# Patient Record
Sex: MALE | Race: WHITE | NOT HISPANIC OR LATINO | Employment: OTHER | URBAN - METROPOLITAN AREA
[De-identification: names, ages, dates, MRNs, and addresses within clinical notes are randomized per-mention and may not be internally consistent; named-entity substitution may affect disease eponyms.]

---

## 2017-12-11 ENCOUNTER — HOSPITAL ENCOUNTER (EMERGENCY)
Facility: HOSPITAL | Age: 62
Discharge: HOME | End: 2017-12-11
Attending: EMERGENCY MEDICINE
Payer: COMMERCIAL

## 2017-12-11 VITALS
BODY MASS INDEX: 32.96 KG/M2 | DIASTOLIC BLOOD PRESSURE: 77 MMHG | HEART RATE: 105 BPM | HEIGHT: 67 IN | TEMPERATURE: 100 F | WEIGHT: 210 LBS | OXYGEN SATURATION: 96 % | SYSTOLIC BLOOD PRESSURE: 119 MMHG | RESPIRATION RATE: 20 BRPM

## 2017-12-11 DIAGNOSIS — J18.9 PNEUMONIA OF RIGHT LOWER LOBE DUE TO INFECTIOUS ORGANISM: Primary | ICD-10-CM

## 2017-12-11 LAB
ANION GAP SERPL CALC-SCNC: 11 MMOL/L
BASOPHILS # BLD AUTO: 0 K/UL
BASOPHILS NFR BLD: 0.1 %
BUN SERPL-MCNC: 24 MG/DL
CALCIUM SERPL-MCNC: 9 MG/DL
CHLORIDE SERPL-SCNC: 99 MMOL/L
CO2 SERPL-SCNC: 25 MMOL/L
CREAT SERPL-MCNC: 1.4 MG/DL
DIFFERENTIAL METHOD: ABNORMAL
EOSINOPHIL # BLD AUTO: 0 K/UL
EOSINOPHIL NFR BLD: 0.1 %
ERYTHROCYTE [DISTWIDTH] IN BLOOD BY AUTOMATED COUNT: 14 %
EST. GFR  (AFRICAN AMERICAN): >60 ML/MIN/1.73 M^2
EST. GFR  (NON AFRICAN AMERICAN): 54 ML/MIN/1.73 M^2
FLUAV AG SPEC QL IA: NEGATIVE
FLUBV AG SPEC QL IA: NEGATIVE
GLUCOSE SERPL-MCNC: 112 MG/DL
HCT VFR BLD AUTO: 49.5 %
HGB BLD-MCNC: 16.6 G/DL
LACTATE SERPL-SCNC: 1.1 MMOL/L
LYMPHOCYTES # BLD AUTO: 0.7 K/UL
LYMPHOCYTES NFR BLD: 10.7 %
MCH RBC QN AUTO: 28.8 PG
MCHC RBC AUTO-ENTMCNC: 33.6 G/DL
MCV RBC AUTO: 86 FL
MONOCYTES # BLD AUTO: 0.4 K/UL
MONOCYTES NFR BLD: 6.2 %
NEUTROPHILS # BLD AUTO: 5.1 K/UL
NEUTROPHILS NFR BLD: 82.9 %
PLATELET # BLD AUTO: 168 K/UL
PMV BLD AUTO: 8.4 FL
POTASSIUM SERPL-SCNC: 4.7 MMOL/L
RBC # BLD AUTO: 5.78 M/UL
SODIUM SERPL-SCNC: 135 MMOL/L
SPECIMEN SOURCE: NORMAL
WBC # BLD AUTO: 6.2 K/UL

## 2017-12-11 PROCEDURE — 36415 COLL VENOUS BLD VENIPUNCTURE: CPT

## 2017-12-11 PROCEDURE — 80048 BASIC METABOLIC PNL TOTAL CA: CPT

## 2017-12-11 PROCEDURE — 96361 HYDRATE IV INFUSION ADD-ON: CPT

## 2017-12-11 PROCEDURE — 25000003 PHARM REV CODE 250: Performed by: EMERGENCY MEDICINE

## 2017-12-11 PROCEDURE — 85025 COMPLETE CBC W/AUTO DIFF WBC: CPT

## 2017-12-11 PROCEDURE — 83605 ASSAY OF LACTIC ACID: CPT

## 2017-12-11 PROCEDURE — 63600175 PHARM REV CODE 636 W HCPCS: Performed by: EMERGENCY MEDICINE

## 2017-12-11 PROCEDURE — 99284 EMERGENCY DEPT VISIT MOD MDM: CPT | Mod: 25

## 2017-12-11 PROCEDURE — 96374 THER/PROPH/DIAG INJ IV PUSH: CPT

## 2017-12-11 PROCEDURE — 87400 INFLUENZA A/B EACH AG IA: CPT

## 2017-12-11 RX ORDER — ONDANSETRON 2 MG/ML
4 INJECTION INTRAMUSCULAR; INTRAVENOUS
Status: COMPLETED | OUTPATIENT
Start: 2017-12-11 | End: 2017-12-11

## 2017-12-11 RX ORDER — AMOXICILLIN 875 MG/1
875 TABLET, FILM COATED ORAL 2 TIMES DAILY
Qty: 13 TABLET | Refills: 0 | Status: SHIPPED | OUTPATIENT
Start: 2017-12-11 | End: 2017-12-18

## 2017-12-11 RX ORDER — ONDANSETRON 4 MG/1
4 TABLET, ORALLY DISINTEGRATING ORAL EVERY 8 HOURS PRN
Qty: 12 TABLET | Refills: 0 | Status: SHIPPED | OUTPATIENT
Start: 2017-12-11 | End: 2020-01-28

## 2017-12-11 RX ORDER — ACETAMINOPHEN 325 MG/1
650 TABLET ORAL
Status: COMPLETED | OUTPATIENT
Start: 2017-12-11 | End: 2017-12-11

## 2017-12-11 RX ORDER — AZITHROMYCIN 250 MG/1
500 TABLET, FILM COATED ORAL
Status: COMPLETED | OUTPATIENT
Start: 2017-12-11 | End: 2017-12-11

## 2017-12-11 RX ORDER — AMOXICILLIN 500 MG/1
1000 CAPSULE ORAL
Status: COMPLETED | OUTPATIENT
Start: 2017-12-11 | End: 2017-12-11

## 2017-12-11 RX ORDER — AZITHROMYCIN 250 MG/1
250 TABLET, FILM COATED ORAL DAILY
Qty: 4 TABLET | Refills: 0 | Status: SHIPPED | OUTPATIENT
Start: 2017-12-12 | End: 2017-12-16

## 2017-12-11 RX ADMIN — ONDANSETRON HYDROCHLORIDE 4 MG: 2 INJECTION, SOLUTION INTRAMUSCULAR; INTRAVENOUS at 09:12

## 2017-12-11 RX ADMIN — AMOXICILLIN 1000 MG: 500 CAPSULE ORAL at 11:12

## 2017-12-11 RX ADMIN — AZITHROMYCIN 500 MG: 250 TABLET, FILM COATED ORAL at 11:12

## 2017-12-11 RX ADMIN — SODIUM CHLORIDE 1000 ML: 0.9 INJECTION, SOLUTION INTRAVENOUS at 09:12

## 2017-12-11 RX ADMIN — ACETAMINOPHEN 650 MG: 325 TABLET ORAL at 09:12

## 2017-12-11 NOTE — ED PROVIDER NOTES
Encounter Date: 12/11/2017    SCRIBE #1 NOTE: ISchuyler am scribing for, and in the presence of, Dr. Teague .       History     Chief Complaint   Patient presents with    Cough     x 1 week     Fever     flu shot last week        12/11/2017 9:22 AM     Chief complaint: Cough and Fever      Von Griffin Jr. is a 62 y.o. male with a hx of CTS who presents to the ED with complaints of productive cough and 101-102 fever  x 1 week. Associated sx are congestion, CP, decrease in appetite, nausea and generalized weakness. Spouse report a fever of 102.6 this morning. He reports diarrhea x 2 days which resolved. Pt denies vomiting, recent abroad travel, sick contact. No alleviating or exacerbating sx noted. NKDA noted.      The history is provided by the patient and the spouse.     Review of patient's allergies indicates:  No Known Allergies  Past Medical History:   Diagnosis Date    CTS (carpal tunnel syndrome)      Past Surgical History:   Procedure Laterality Date    CARPAL TUNNEL RELEASE      COLONOSCOPY N/A 12/1/2015    Procedure: COLONOSCOPY;  Surgeon: Darin Gutierrez MD;  [facility];  Service: Endoscopy;  Laterality: N/A;    EYE SURGERY      Eye Lid    TONSILLECTOMY       History reviewed. No pertinent family history.  Social History   Substance Use Topics    Smoking status: Former Smoker     Packs/day: 25.00    Smokeless tobacco: Not on file      Comment: quit 1985    Alcohol use Yes      Comment: Socially     Review of Systems   Constitutional: Positive for appetite change (Decrease ) and fever.   HENT: Positive for congestion. Negative for sore throat.    Eyes: Negative for redness.   Respiratory: Positive for cough. Negative for shortness of breath.    Cardiovascular: Positive for chest pain.   Gastrointestinal: Positive for diarrhea (Resolved) and nausea. Negative for vomiting.   Genitourinary: Negative for dysuria.   Musculoskeletal: Negative for back pain.   Skin: Negative for  rash.   Neurological: Positive for weakness (generalized).   Hematological: Does not bruise/bleed easily.       Physical Exam     Initial Vitals [12/11/17 0840]   BP Pulse Resp Temp SpO2   (!) 143/80 102 20 99.5 °F (37.5 °C) 98 %      MAP       101         Physical Exam    Nursing note and vitals reviewed.  Constitutional: He appears well-developed and well-nourished. He is not diaphoretic. No distress.   HENT:   Head: Normocephalic and atraumatic.   Mouth/Throat: Oropharynx is clear and moist.   Eyes: Conjunctivae are normal.   Neck: Neck supple.   Cardiovascular: Regular rhythm, normal heart sounds and intact distal pulses. Tachycardia present.  Exam reveals no gallop and no friction rub.    No murmur heard.  Mild tachycardia.    Pulmonary/Chest: He has wheezes (scattered). He has rhonchi (at bases). He has no rales.   Abdominal: Soft. He exhibits no distension. There is no tenderness.   Musculoskeletal: Normal range of motion.   Neurological: He is alert and oriented to person, place, and time.   Skin: No rash noted. No erythema.   Psychiatric: He has a normal mood and affect. His behavior is normal. Judgment and thought content normal.         ED Course   Procedures  Labs Reviewed   CBC W/ AUTO DIFFERENTIAL - Abnormal; Notable for the following:        Result Value    MPV 8.4 (*)     Lymph # 0.7 (*)     Gran% 82.9 (*)     Lymph% 10.7 (*)     All other components within normal limits   BASIC METABOLIC PANEL - Abnormal; Notable for the following:     Sodium 135 (*)     Glucose 112 (*)     BUN, Bld 24 (*)     eGFR if non  54 (*)     All other components within normal limits   LACTIC ACID, PLASMA   INFLUENZA A AND B ANTIGEN                        Scribe Attestation:   Scribe #1: I performed the above scribed service and the documentation accurately describes the services I performed. I attest to the accuracy of the note.    I, Dr. Avni Teague, personally performed the services described in  this documentation. All medical record entries made by the scribe were at my direction and in my presence.  I have reviewed the chart and agree that the record reflects my personal performance and is accurate and complete. Avni Teague MD.  2:44 PM 12/11/2017    Von Griffin Jr. is a 62 y.o. male presenting with upper respiratory symptoms and fever concerning for possible acquired pneumonia.  I do not think the patient requires admission for IV antibiotics for respiratory monitoring.  I doubt sepsis.  Lactic acid is normal.  He may be mildly dehydrated.  CURB 65 score is 1 due to mildly elevated BUN only.  He is appropriate for outpatient therapy.  Amoxicillin and azithromycin begun in ED with prescriptions given for outpatient course pending close PCP follow-up.  I doubt other emergent process such as myocarditis, PE, CHF.  Tachycardia resolved prior to discharge with acetaminophen and IVF.  F/u with PCP.  Return precautions reviewed.            ED Course as of Dec 11 1444   Mon Dec 11, 2017   0956 CXR:  RLL infiltrate consistent with pneumonia. (my read)  [MR]      ED Course User Index  [MR] Avni Teague MD     Clinical Impression:     1. Pneumonia of right lower lobe due to infectious organism                               Avni Teague MD  12/11/17 3969

## 2017-12-12 ENCOUNTER — TELEPHONE (OUTPATIENT)
Dept: FAMILY MEDICINE | Facility: CLINIC | Age: 62
End: 2017-12-12

## 2017-12-12 NOTE — TELEPHONE ENCOUNTER
----- Message from Yvette Martinez RT sent at 12/12/2017  2:40 PM CST -----  Contact: Destinee (wife) [phone]   Destinee (wife) [phone], requesting an [facility] ER F/U appt for the pt in one week from today, please work pt in, thanks.

## 2017-12-13 ENCOUNTER — NURSE TRIAGE (OUTPATIENT)
Dept: ADMINISTRATIVE | Facility: CLINIC | Age: 62
End: 2017-12-13

## 2017-12-13 NOTE — TELEPHONE ENCOUNTER
Reason for Disposition   Information only question and nurse able to answer    Protocols used: ST NO PROTOCOL AVAILABLE - INFORMATION ONLY-A-OH    Wife calling to report temp of 96.5. No other symptoms.  No shivering.   Had been up walking around.  Now in bed covered up.  Advised to recheck after in bed for a while and call back if temp did not come up or with any other questions/concerns.

## 2020-01-28 ENCOUNTER — OFFICE VISIT (OUTPATIENT)
Dept: ORTHOPEDICS | Facility: CLINIC | Age: 65
End: 2020-01-28
Payer: COMMERCIAL

## 2020-01-28 VITALS
DIASTOLIC BLOOD PRESSURE: 93 MMHG | HEIGHT: 67 IN | HEART RATE: 80 BPM | WEIGHT: 210 LBS | SYSTOLIC BLOOD PRESSURE: 150 MMHG | BODY MASS INDEX: 32.96 KG/M2

## 2020-01-28 DIAGNOSIS — G56.02 CARPAL TUNNEL SYNDROME ON LEFT: ICD-10-CM

## 2020-01-28 DIAGNOSIS — G56.01 CARPAL TUNNEL SYNDROME ON RIGHT: Primary | ICD-10-CM

## 2020-01-28 DIAGNOSIS — M54.12 CERVICAL RADICULITIS: ICD-10-CM

## 2020-01-28 DIAGNOSIS — Z98.890 HISTORY OF CARPAL TUNNEL SURGERY OF RIGHT WRIST: ICD-10-CM

## 2020-01-28 DIAGNOSIS — M19.012 ARTHRITIS OF LEFT ACROMIOCLAVICULAR JOINT: ICD-10-CM

## 2020-01-28 PROCEDURE — 99204 OFFICE O/P NEW MOD 45 MIN: CPT | Mod: 25,S$GLB,, | Performed by: ORTHOPAEDIC SURGERY

## 2020-01-28 PROCEDURE — 99204 PR OFFICE/OUTPT VISIT, NEW, LEVL IV, 45-59 MIN: ICD-10-PCS | Mod: 25,S$GLB,, | Performed by: ORTHOPAEDIC SURGERY

## 2020-01-28 PROCEDURE — 3008F PR BODY MASS INDEX (BMI) DOCUMENTED: ICD-10-PCS | Mod: S$GLB,,, | Performed by: ORTHOPAEDIC SURGERY

## 2020-01-28 PROCEDURE — 3008F BODY MASS INDEX DOCD: CPT | Mod: S$GLB,,, | Performed by: ORTHOPAEDIC SURGERY

## 2020-01-28 NOTE — PROGRESS NOTES
[facility] ORTHOPEDICS    Subjective:     Chief Complaint:   Chief Complaint   Patient presents with    Left Hand - Pain     Left hand pain x a while. States that he has numbness in the last 2 fingers.     Right Hand - Pain     Right hand pain x a while. He had a CTR in his right hand and states that he has issues with the hand still being sore and numbness in the hand.     Left Shoulder - Pain     Left shoulder pain x a while. States that he has pain in the shoulder and states that he has burning and tingling pain in the arm.        Past Medical History:   Diagnosis Date    CTS (carpal tunnel syndrome)        Past Surgical History:   Procedure Laterality Date    CARPAL TUNNEL RELEASE      COLONOSCOPY N/A 12/1/2015    Procedure: COLONOSCOPY;  Surgeon: Darin Gutierrez MD;  Location: [facility];  Service: Endoscopy;  Laterality: N/A;    EYE SURGERY      Eye Lid    TONSILLECTOMY         No current outpatient medications on file.     No current facility-administered medications for this visit.        Review of patient's allergies indicates:  No Known Allergies    History reviewed. No pertinent family history.    Social History     Socioeconomic History    Marital status:      Spouse name: Not on file    Number of children: Not on file    Years of education: Not on file    Highest education level: Not on file   Occupational History    Not on file   Social Needs    Financial resource strain: Not on file    Food insecurity:     Worry: Not on file     Inability: Not on file    Transportation needs:     Medical: Not on file     Non-medical: Not on file   Tobacco Use    Smoking status: Former Smoker     Packs/day: 25.00    Tobacco comment: quit 1985   Substance and Sexual Activity    Alcohol use: Yes     Comment: Socially    Drug use: No    Sexual activity: Not on file   Lifestyle    Physical activity:     Days per week: Not on file     Minutes per session: Not on file    Stress: Not on file    Relationships    Social connections:     Talks on phone: Not on file     Gets together: Not on file     Attends Anabaptist service: Not on file     Active member of club or organization: Not on file     Attends meetings of clubs or organizations: Not on file     Relationship status: Not on file   Other Topics Concern    Not on file   Social History Narrative    Not on file       History of present illness:  Patient comes in today for his bilateral upper extremities and his left shoulder.  He has got numbness and tingling in both his arms and hands.  He has had a carpal tunnel release on the right and cubital tunnel release on the right.  The left is much worse.  He is having wasting in his fingers and thumbs.  He is an avid athlete weight .  He trains daily.  He also has left shoulder pain. He describes it as mild.      Review of Systems:    Constitution: Negative for chills, fever, and sweats.  Negative for unexplained weight loss.    HENT:  Negative for headaches and blurry vision.    Cardiovascular:Negative for chest pain or irregular heart beat. Negative for hypertension.    Respiratory:  Negative for cough and shortness of breath.    Gastrointestinal: Negative for abdominal pain, heartburn, melena, nausea, and vomitting.    Genitourinary:  Negative bladder incontinence and dysuria.    Musculoskeletal:  See HPI for details.     Neurological: Negative for numbness.    Psychiatric/Behavioral: Negative for depression.  The patient is not nervous/anxious.      Endocrine: Negative for polyuria    Hematologic/Lymphatic: Negative for bleeding problem.  Does not bruise/bleed easily.    Skin: Negative for poor would healing and rash    Objective:      Physical Examination:    Vital Signs:    Vitals:    01/28/20 1348   BP: (!) 150/93   Pulse: 80       Body mass index is 32.89 kg/m².    This a well-developed, well nourished patient in no acute distress.  They are alert and oriented and cooperative to examination.         Patient has decreased sensation in the bilateral hands in both the median nail nerve distributions.  He does have hypothenar and thenar wasting.  He is very well muscled.  He has wasting of the triceps muscle.  The wasting is on the left side.  Spurling sign is negative.  Full range of motion of the cervical spine.  He has full range of motion is bilateral shoulders.  His painful arc of motion of the left shoulder.  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  I have ordered nerve conduction study EMG.  In terms of his shoulder he has severe arthritis of the glenohumeral joint.  Because he is not particularly symptomatic we will not intervene.  He will follow up with nerve conduction studies      This note was created using Dragon voice recognition software that occasionally misinterpreted phrases or words.

## 2020-02-03 ENCOUNTER — TELEPHONE (OUTPATIENT)
Dept: PHYSICAL MEDICINE AND REHAB | Facility: CLINIC | Age: 65
End: 2020-02-03

## 2020-02-03 ENCOUNTER — TELEPHONE (OUTPATIENT)
Dept: ORTHOPEDICS | Facility: CLINIC | Age: 65
End: 2020-02-03

## 2020-02-03 NOTE — TELEPHONE ENCOUNTER
----- Message from Lisy Weir sent at 2/3/2020  2:45 PM CST -----  Contact: self / [phone]  Needs to speak with you on his appointment. Please advise

## 2020-02-03 NOTE — TELEPHONE ENCOUNTER
----- Message from Madeleine Nino sent at 2/3/2020  1:19 PM CST -----  Contact: patient  He was calling because we set him up for an EMG and he said he doesn't need that but a NCS. Can we change the orders to an NCS instead of an EMG and send it to Dr. Ortiz? Call him back at [phone].

## 2020-02-03 NOTE — TELEPHONE ENCOUNTER
Spoke with pt does not want EMG just NCS. We will send in a new order to Dr. Cavanaugh for his NCS

## 2020-02-03 NOTE — TELEPHONE ENCOUNTER
----- Message from Annette Mohan sent at 2/3/2020  1:30 PM CST -----  Contact: Patient [phone]  Please call patient about his NCV not being scheduled. Dr Lam

## 2020-02-03 NOTE — TELEPHONE ENCOUNTER
----- Message from Shadia Simms sent at 2/3/2020  3:33 PM CST -----  Contact: patient  Type:  Patient Returning Call    Who Called:  patient  Who Left Message for Patient:  Jayla  Does the patient know what this is regarding?:  yes  Best Call Back Number:  [phone]  Additional Information:  Requesting a call back,call placed to pod

## 2020-02-05 ENCOUNTER — OFFICE VISIT (OUTPATIENT)
Dept: PHYSICAL MEDICINE AND REHAB | Facility: CLINIC | Age: 65
End: 2020-02-05
Payer: COMMERCIAL

## 2020-02-05 DIAGNOSIS — G56.20 ULNAR NEUROPATHY AT ELBOW, UNSPECIFIED LATERALITY: ICD-10-CM

## 2020-02-05 DIAGNOSIS — M54.12 CERVICAL RADICULOPATHY AT C8: Primary | ICD-10-CM

## 2020-02-05 DIAGNOSIS — G56.03 CARPAL TUNNEL SYNDROME ON BOTH SIDES: ICD-10-CM

## 2020-02-05 DIAGNOSIS — M54.12 CERVICAL RADICULITIS: ICD-10-CM

## 2020-02-05 DIAGNOSIS — M62.50 DENERVATION ATROPHY OF MUSCLE: ICD-10-CM

## 2020-02-05 PROCEDURE — 95911 NRV CNDJ TEST 9-10 STUDIES: CPT | Mod: 26,S$GLB,, | Performed by: PHYSICAL MEDICINE & REHABILITATION

## 2020-02-05 PROCEDURE — 99499 UNLISTED E&M SERVICE: CPT | Mod: S$GLB,,, | Performed by: PHYSICAL MEDICINE & REHABILITATION

## 2020-02-05 PROCEDURE — 99499 NO LOS: ICD-10-PCS | Mod: S$GLB,,, | Performed by: PHYSICAL MEDICINE & REHABILITATION

## 2020-02-05 PROCEDURE — 95886 MUSC TEST DONE W/N TEST COMP: CPT | Mod: 26,S$GLB,, | Performed by: PHYSICAL MEDICINE & REHABILITATION

## 2020-02-05 PROCEDURE — 95886 PR EMG COMPLETE, W/ NERVE CONDUCTION STUDIES, 5+ MUSCLES: ICD-10-PCS | Mod: 26,S$GLB,, | Performed by: PHYSICAL MEDICINE & REHABILITATION

## 2020-02-05 PROCEDURE — 95911 PR NERVE CONDUCTION STUDY; 9-10 STUDIES: ICD-10-PCS | Mod: 26,S$GLB,, | Performed by: PHYSICAL MEDICINE & REHABILITATION

## 2020-02-05 NOTE — PROCEDURES
Procedures         [facility]  Physical Medicine and Rehabilitation   [address] [place] [address], [address]  [place] [address]             Full Name: Von Griffin Patient ID: [id number]      Visit Date: 2/5/2020 16:07      Sensory NCS      Nerve / Sites Rec. Site Onset Lat Peak Lat NP Amp Segments Distance Velocity     ms ms µV  cm m/s   L Median - Digit II (Antidromic)      Wrist Dig II 4.01 5.05 6.6 Wrist - Dig II 14 35   R Median - Digit II (Antidromic)      Wrist Dig II 3.18 4.06 7.3 Wrist - Dig II 14 44   L Ulnar - Digit V (Antidromic)      Wrist Dig V 2.66 3.39 4.7 Wrist - Dig V 14 53   R Ulnar - Digit V (Antidromic)      Wrist Dig V NR NR NR Wrist - Dig V 14 NR   L Radial - Anatomical snuff box (Forearm)      Forearm Wrist 2.14 3.02 7.8 Forearm - Wrist 10 47   R Radial - Anatomical snuff box (Forearm)      Forearm Wrist 2.08 2.50 4.6 Forearm - Wrist 10 48       Motor NCS      Nerve / Sites Muscle Latency Amplitude Amp % Duration Segments Distance Lat Diff Velocity     ms mV % ms  cm ms m/s   L Median - APB      Wrist APB 5.57 5.4 100 5.89 Wrist - APB 8        Elbow APB 9.84 4.9 91.7 6.04 Elbow - Wrist 19 4.27 44   R Median - APB      Wrist APB 4.69 3.5 100 5.99 Wrist - APB 8        Elbow APB 10.05 0.5 14.4 6.98 Elbow - Wrist 19 5.36 35   L Ulnar - ADM      Wrist ADM 3.75 2.0 100 3.65 Wrist - ADM 8        B.Elbow ADM 7.92 1.7 86 4.01 B.Elbow - Wrist 21 4.17 50      A.Elbow ADM 10.57 1.2 60.5 4.84 A.Elbow - B.Elbow 8 2.66 30   R Ulnar - ADM      Wrist ADM 3.13 4.9 100 6.88 Wrist - ADM 8        B.Elbow ADM 6.56 2.0 40.7 4.95 B.Elbow - Wrist 18 3.44 52      A.Elbow ADM 8.70 2.3 47.1 5.16 A.Elbow - B.Elbow 8 2.14 37       EMG Summary Table     Spontaneous MUAP Recruitment   Muscle IA Fib PSW Fasc Other Amp Dur. PPP Pattern   R. Deltoid N None None None . N N N N   L. Deltoid N None None None . N N N N   L. Biceps brachii N None None None . N N N N   L. Triceps brachii 1- 4+ 4+ None . 1- 1- N Discrete   L.  Pronator teres 1+ None None None . N 2+ 2+ Reduced   L. Abductor pollicis brevis 2+ 2+ 2+ None . N 2+ 2+ Reduced   L. First dorsal interosseous 2+ 3+ 3+ None . N 2+ 2+ Reduced   L. Cervical paraspinals 2+ 2+ 2+ None            Summary    The motor conduction test was performed on 4 nerve(s). The results were normal in 1 nerve(s): R Ulnar - ADM. Results outside the specified normal range were found in 3 nerve(s), as follows:   In the L Median - APB study  o the take off latency result was increased for Wrist stimulation  o the peak amplitude result was reduced for Wrist stimulation  o the take off velocity result was reduced for Elbow - Wrist segment   In the R Median - APB study  o the take off latency result was increased for Wrist stimulation  o the peak amplitude result was reduced for Wrist stimulation  o the take off velocity result was reduced for Elbow - Wrist segment   In the L Ulnar - ADM study  o the peak amplitude result was reduced for Wrist stimulation    The sensory conduction test had results outside of the specified normal range in all 6 of the tested nerves:   In the L Median - Digit II (Antidromic) study  o the peak latency result was increased for Wrist stimulation  o the peak amplitude result was reduced for Wrist stimulation   In the R Median - Digit II (Antidromic) study  o the peak latency result was increased for Wrist stimulation  o the peak amplitude result was reduced for Wrist stimulation   In the L Ulnar - Digit V (Antidromic) study  o the peak amplitude result was reduced for Wrist stimulation   In the R Ulnar - Digit V (Antidromic) study  o the response was considered absent for Wrist stimulation   In the L Radial - Anatomical snuff box (Forearm) study  o the peak latency result was increased for Forearm stimulation  o the peak amplitude result was reduced for Forearm stimulation   In the R Radial - Anatomical snuff box (Forearm) study  o the peak amplitude result was reduced  for Forearm stimulation    The needle EMG examination was performed in 8 muscles. It was normal in 3 muscle(s): R. Deltoid, L. Deltoid, L. Biceps brachii. The study was abnormal in 5 muscle(s), with the following distribution:   Abnormal spontaneous/insertional activity was found in L. Triceps brachii, L. Pronator teres, L. Abductor pollicis brevis, L. First dorsal interosseous, L. Cervical paraspinals.   The MUP waveform abnormality was found in L. Triceps brachii, L. Pronator teres, L. Abductor pollicis brevis, L. First dorsal interosseous.   Abnormal interference pattern was found in L. Triceps brachii, L. Pronator teres, L. Abductor pollicis brevis, L. First dorsal interosseous.          Impression:  Abnormal examination.  There is electrodiagnostic evidence of:  1. An acute on chronic left C8 radiculopathy with evidence of denervation and reinnervation in the left triceps (radial C6, C7, and C8) left abductor pollicis brevis (median C8,T1) and 1st dorsal interossei (ulnar C8, T1)  2. Likely chronic left C7 radiculopathy with evidence of reinnervation.  3. 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 Ulnar neuropathy at the elbow on the right  5. Moderate right median mononeuropathy at the wrist (carpal tunnel syndrome.)  6. Moderate left median mononeuropathy at the wrist (carpal tunnel syndrome.)    Clinical history:  The chief complaint of left triceps atrophy and numbness and tingling in the left ring and small finger is consistent with the above diagnoses.  He has the finding of an acute on chronic left C7 radiculopathy with evidence of severe denervation of the left triceps and denervation and reinnervation of the left abductor pollicis brevis and 1st dorsal interossei.  He does have evidence of ulnar neuropathy at the elbow on the left but given the diffuseness of the findings in the C8 innervated muscles by the radial, median, and ulnar nerves, his symptoms are strongly suggestive of a C8  radiculopathy.  He also has decreased velocity of the median nerve which is likely secondary to axonal loss of the C8 nerve root.  I would recommend a new MRI of the cervical spine to assess for any abnormal pathology at C7-T1.  ____________________________   Wayne Ortiz D.O.  Board-Certified by the American Board of Physical Medicine and Rehabilitation  Board-Certified by the American Board of Electrodiagnostic Medicine

## 2020-02-05 NOTE — LETTER
February 5, 2020      Baldo Lam MD  [address]  [address]  [place] [address]           [place] [facility]  [address] [place] [address]  [address]  [place] [address]  Phone: [phone]  Fax: [phone]          Patient: Von Griffin Jr.   MR Number: [id number]   YOB: 1955   Date of Visit: 2/5/2020       Dear Dr. Baldo Lam:    Thank you for referring Von Griffin to me for evaluation. Attached you will find relevant portions of my assessment and plan of care.    If you have questions, please do not hesitate to call me. I look forward to following Von Griffin along with you.    Sincerely,    Wayne Ortiz MD    Enclosure  CC:  No Recipients    If you would like to receive this communication electronically, please contact [email or web address] or [phone] to request more information on [employer] access.    For providers and/or their staff who would like to refer a patient to [facility], please contact us through our one-stop-shop provider referral line, [facility] [place], at [phone].    If you feel you have received this communication in error or would no longer like to receive these types of communications, please e-mail [email or web address]

## 2020-02-11 ENCOUNTER — OFFICE VISIT (OUTPATIENT)
Dept: ORTHOPEDICS | Facility: CLINIC | Age: 65
End: 2020-02-11
Payer: COMMERCIAL

## 2020-02-11 VITALS — SYSTOLIC BLOOD PRESSURE: 160 MMHG | WEIGHT: 210 LBS | DIASTOLIC BLOOD PRESSURE: 100 MMHG | BODY MASS INDEX: 32.89 KG/M2

## 2020-02-11 DIAGNOSIS — M54.12 CERVICAL RADICULITIS: Primary | ICD-10-CM

## 2020-02-11 PROCEDURE — 99213 PR OFFICE/OUTPT VISIT, EST, LEVL III, 20-29 MIN: ICD-10-PCS | Mod: S$GLB,,, | Performed by: ORTHOPAEDIC SURGERY

## 2020-02-11 PROCEDURE — 3008F BODY MASS INDEX DOCD: CPT | Mod: S$GLB,,, | Performed by: ORTHOPAEDIC SURGERY

## 2020-02-11 PROCEDURE — 3008F PR BODY MASS INDEX (BMI) DOCUMENTED: ICD-10-PCS | Mod: S$GLB,,, | Performed by: ORTHOPAEDIC SURGERY

## 2020-02-11 PROCEDURE — 99213 OFFICE O/P EST LOW 20 MIN: CPT | Mod: S$GLB,,, | Performed by: ORTHOPAEDIC SURGERY

## 2020-02-11 NOTE — PROGRESS NOTES
[facility] ORTHOPEDICS    Subjective:     Chief Complaint:   Chief Complaint   Patient presents with    Left Hand - Pain     EMG results Bilat UE     Right Hand - Pain       Past Medical History:   Diagnosis Date    CTS (carpal tunnel syndrome)        Past Surgical History:   Procedure Laterality Date    CARPAL TUNNEL RELEASE      COLONOSCOPY N/A 12/1/2015    Procedure: COLONOSCOPY;  Surgeon: Darin Gutierrez MD;  Location: [facility];  Service: Endoscopy;  Laterality: N/A;    EYE SURGERY      Eye Lid    TONSILLECTOMY         No current outpatient medications on file.     No current facility-administered medications for this visit.        Review of patient's allergies indicates:  No Known Allergies    History reviewed. No pertinent family history.    Social History     Socioeconomic History    Marital status:      Spouse name: Not on file    Number of children: Not on file    Years of education: Not on file    Highest education level: Not on file   Occupational History    Not on file   Social Needs    Financial resource strain: Not on file    Food insecurity:     Worry: Not on file     Inability: Not on file    Transportation needs:     Medical: Not on file     Non-medical: Not on file   Tobacco Use    Smoking status: Former Smoker     Packs/day: 25.00    Tobacco comment: quit 1985   Substance and Sexual Activity    Alcohol use: Yes     Comment: Socially    Drug use: No    Sexual activity: Not on file   Lifestyle    Physical activity:     Days per week: Not on file     Minutes per session: Not on file    Stress: Not on file   Relationships    Social connections:     Talks on phone: Not on file     Gets together: Not on file     Attends [employer]ian service: Not on file     Active member of club or organization: Not on file     Attends meetings of clubs or organizations: Not on file     Relationship status: Not on file   Other Topics Concern    Not on file   Social History Narrative     Not on file       History of present illness:  Patient comes in today for his bilateral hands.  He has had his nerve conduction studies      Review of Systems:    Constitution: Negative for chills, fever, and sweats.  Negative for unexplained weight loss.    HENT:  Negative for headaches and blurry vision.    Cardiovascular:Negative for chest pain or irregular heart beat. Negative for hypertension.    Respiratory:  Negative for cough and shortness of breath.    Gastrointestinal: Negative for abdominal pain, heartburn, melena, nausea, and vomitting.    Genitourinary:  Negative bladder incontinence and dysuria.    Musculoskeletal:  See HPI for details.     Neurological: Negative for numbness.    Psychiatric/Behavioral: Negative for depression.  The patient is not nervous/anxious.      Endocrine: Negative for polyuria    Hematologic/Lymphatic: Negative for bleeding problem.  Does not bruise/bleed easily.    Skin: Negative for poor would healing and rash    Objective:      Physical Examination:    Vital Signs:    Vitals:    02/11/20 1255   BP: (!) 160/100       Body mass index is 32.89 kg/m².    This a well-developed, well nourished patient in no acute distress.  They are alert and oriented and cooperative to examination.        .  Patient has full range of motion of his left and right hands.  He has full range of motion of the cervical spine.  He has significant atrophy of the left triceps.  Pertinent New Results:  No conduction study/EMG is a reviewed.  The results reviewed.  XRAY Report / Interpretation:       Assessment/Plan:      Bilateral carpal tunnel syndrome.  Bilateral cubital tunnel syndrome.  C-8 radiculitis severe.  I have referred him to neuro surgery for possible decompression.  He will follow up with neuro surgery      This note was created using Dragon voice recognition software that occasionally misinterpreted phrases or words.

## 2020-02-17 ENCOUNTER — OFFICE VISIT (OUTPATIENT)
Dept: ORTHOPEDICS | Facility: CLINIC | Age: 65
End: 2020-02-17
Payer: COMMERCIAL

## 2020-02-17 VITALS — SYSTOLIC BLOOD PRESSURE: 140 MMHG | DIASTOLIC BLOOD PRESSURE: 92 MMHG | BODY MASS INDEX: 32.89 KG/M2 | WEIGHT: 210 LBS

## 2020-02-17 DIAGNOSIS — M48.02 CERVICAL STENOSIS OF SPINAL CANAL: Primary | ICD-10-CM

## 2020-02-17 DIAGNOSIS — M62.522 ATROPHY OF MUSCLE OF LEFT UPPER ARM: ICD-10-CM

## 2020-02-17 PROCEDURE — 99213 OFFICE O/P EST LOW 20 MIN: CPT | Mod: 25,S$GLB,, | Performed by: ORTHOPAEDIC SURGERY

## 2020-02-17 PROCEDURE — 3008F PR BODY MASS INDEX (BMI) DOCUMENTED: ICD-10-PCS | Mod: S$GLB,,, | Performed by: ORTHOPAEDIC SURGERY

## 2020-02-17 PROCEDURE — 99213 PR OFFICE/OUTPT VISIT, EST, LEVL III, 20-29 MIN: ICD-10-PCS | Mod: 25,S$GLB,, | Performed by: ORTHOPAEDIC SURGERY

## 2020-02-17 PROCEDURE — 3008F BODY MASS INDEX DOCD: CPT | Mod: S$GLB,,, | Performed by: ORTHOPAEDIC SURGERY

## 2020-02-17 NOTE — PROGRESS NOTES
Subjective:       Patient ID: Von Griffin Jr. is a 64 y.o. male.    Chief Complaint: Pain of the Neck (Denies neck pain but has bilateral arm to fingers with numbness/ tingling. Dr Lam referred. EMG done. MRI done of neck at [facility]  2017. He has many disks and reports)      History of Present Illness  This gentleman comes in today with chief complaints of atrophy of his left triceps muscle and intrinsic muscles of his left hand.  He had ulnar nerve transposition on the right elbow by Dr. Lam he denies any neck pain his main complaint is weakness in the triceps area.  He has seen several different physicians.  He had a recent EMG study    Current Medications  No current outpatient medications on file.     No current facility-administered medications for this visit.        Allergies  Review of patient's allergies indicates:  No Known Allergies    Past Medical History  Past Medical History:   Diagnosis Date    CTS (carpal tunnel syndrome)        Surgical History  Past Surgical History:   Procedure Laterality Date    CARPAL TUNNEL RELEASE      COLONOSCOPY N/A 12/1/2015    Procedure: COLONOSCOPY;  Surgeon: Darin Gutierrez MD;  Location: [facility];  Service: Endoscopy;  Laterality: N/A;    EYE SURGERY      Eye Lid    TONSILLECTOMY         Family History:   History reviewed. No pertinent family history.    Social History:   Social History     Socioeconomic History    Marital status:      Spouse name: Not on file    Number of children: Not on file    Years of education: Not on file    Highest education level: Not on file   Occupational History    Not on file   Social Needs    Financial resource strain: Not on file    Food insecurity:     Worry: Not on file     Inability: Not on file    Transportation needs:     Medical: Not on file     Non-medical: Not on file   Tobacco Use    Smoking status: Former Smoker     Packs/day: 25.00    Tobacco comment: quit 1985   Substance and Sexual  Activity    Alcohol use: Yes     Comment: Socially    Drug use: No    Sexual activity: Not on file   Lifestyle    Physical activity:     Days per week: Not on file     Minutes per session: Not on file    Stress: Not on file   Relationships    Social connections:     Talks on phone: Not on file     Gets together: Not on file     Attends [employer] service: Not on file     Active member of club or organization: Not on file     Attends meetings of clubs or organizations: Not on file     Relationship status: Not on file   Other Topics Concern    Not on file   Social History Narrative    Not on file       Hospitalization/Major Diagnostic Procedure:     Review of Systems     General/Constitutional:  Chills denies. Fatigue denies. Fever denies. Weight gain denies. Weight loss denies.    Respiratory:  Shortness of breath denies.    Cardiovascular:  Chest pain denies.    Gastrointestinal:  Constipation denies. Diarrhea denies. Nausea denies. Vomiting denies.     Hematology:  Easy bruising denies. Prolonged bleeding denies.     Genitourinary:  Frequent urination denies. Pain in lower back denies. Painful urination denies.     Musculoskeletal:  See HPI for details    Skin:  Rash denies.    Neurologic:  Dizziness denies. Gait abnormalities denies. Seizures denies. Tingling/Numbess denies.    Psychiatric:  Anxiety denies. Depressed mood denies.     Objective:   Vital Signs:   Vitals:    02/17/20 1234   BP: (!) 140/92        Physical Exam      General Examination:     Constitutional: The patient is alert and oriented to lace person and time. Mood is pleasant.     Head/Face: Normal facial features normal eyebrows    Eyes: Normal extraocular motion bilaterally    Lungs: Respirations are equal and unlabored    Gait is coordinated.    Cardiovascular: There are no swelling or varicosities present.    Lymphatic: Negative for adenopathy    Skin: Normal    Neurological: Level of consciousness normal. Oriented to place person and  time and situation    Psychiatric: Oriented to time place person and situation    Obvious triceps atrophy and weakness on the left no cervical painful range of motion Spurling's maneuver negative.  Tinel's test left elbow positive.  Mild atrophy of the hypothenar muscles.  No long tract signs.    XRAY Report/ Interpretation :  EMG nerve conduction study was reviewed showing a left C7 and T8 nerve root radiculopathy and left ulnar neuropathy.  MRI films from 2017 were reviewed showing multilevel cervical stenosis C5-C7.  AP and lateral x-rays of the cervical spine were performed today. Indications neck pain. Findings:  AP and lateral x-rays of the cervical spine were performed today. Indications neck pain. Findings:  Moderate disc space narrowing C5-6 and C6-7 levels with anterior osteophyte formation indicative longstanding cervical degenerative disc disease      Assessment:       1. Cervical stenosis of spinal canal    2. Atrophy of muscle of left upper arm        Plan:       Von was seen today for pain.    Diagnoses and all orders for this visit:    Cervical stenosis of spinal canal  -     X-Ray Cervical Spine AP And Lateral    Atrophy of muscle of left upper arm  Comments:  triceps         No follow-ups on file.    I believe his atrophy is triceps muscle is from cervical stenosis.  The most prudent type of surgery to offer him would be some type of posterior laminectomy or laminotomy and foraminotomy on the left side to try to regain his strength in the triceps nerve root distribution.  No guarantees that surgery will work but that would be the most logical surgery he does have significant degenerative disc disease seen on x-ray but has no significant neck pain and probably posterior approach would be better.  He has apparently discussed this condition with Dr. Lopez but not seen him as a formal patient. I have explained to him I do not do a great deal posterior cervical surgery and will refer him to Dr. Quick  for evaluation.    This note was created using Dragon voice recognition software that occasionally misinterpreted phrases or words.

## 2020-02-17 NOTE — PATIENT INSTRUCTIONS
Laminotomy  Laminotomy is a surgery that removes a small amount of bone from the spine. This takes pressure off nerves in the low back, which reduces symptoms. These surgeries are not cure-alls, but they are especially good at reducing leg pain.     Part of the lamina is removed from the vertebra above and below the pinched nerve.   Before your surgery  You will most likely arrive at the hospital on the morning of the surgery. Be sure to follow all of your doctors instructions on preparing for surgery:  · Follow any directions you are given for not eating or drinking before surgery.  · If you take a daily medicine, ask if you should still take it the morning of surgery.  · If you take any blood-thinning medicine, such as aspirin, clopidogrel, or warfarin, you should discuss them in great detail with your doctor at least a week in advance.   · At the hospital, your temperature, pulse, breathing, and blood pressure will be checked.  · An IV (intravenous) line will be started to provide fluids and medicines needed during surgery.  During your surgery  · Once in the operating room, youll be given anesthesia.  · After you are asleep, an incision is made near the center of your low back. Your incision may be 2 to 6 inches long, depending on how many vertebrae are involved.  · During a laminotomy, part of the lamina (bone that forms the back of the spinal canal) is removed from the vertebra above and below the pinched nerve. The small opening created is sometimes enough to take pressure off the nerve. But in most cases, disk matter or a bone spur that is pressing on the nerve is also removed.  · Once the nerve is free of pressure, the incision is closed with stitches or surgical staples.  After your surgery  After surgery, youll be sent to the PACU (postanesthesia care unit). When you are fully awake, youll be moved to your room. The nurses will give you medicines to ease your pain. You may have a catheter (small  tube) in your bladder. Soon, healthcare providers will help you get up and moving. Youll also be shown how to keep your lungs clear.  When to call your doctor  Once at home, call your doctor if you have any of the symptoms below:  · Unusual redness, heat, or drainage at the incision site  · Increasing pain, numbness, or weakness in your leg  · Fever over 100.4°F (38°C)  · Swelling, warmth, or pain in your calf   Date Last Reviewed: 9/8/2015  © 0462-8088 [employer]. [address] [place] [address]. All rights reserved. This information is not intended as a substitute for professional medical care. Always follow your healthcare professional's instructions.

## 2020-03-09 ENCOUNTER — TELEPHONE (OUTPATIENT)
Dept: PHYSICAL MEDICINE AND REHAB | Facility: CLINIC | Age: 65
End: 2020-03-09

## 2020-03-09 NOTE — TELEPHONE ENCOUNTER
Patient wants to know if the abdomen stem cells are still being done. He said someone told him that abdominal stem cells no longer can be done? He wants to know if that is true? He wants to do stem cells on his shoulder but he does not want to do bone marrow stem cells.

## 2020-03-09 NOTE — TELEPHONE ENCOUNTER
----- Message from Corey Lopez sent at 3/9/2020 11:46 AM CDT -----  Contact: Patient  Type: Needs Medical Advice    Who Called:  Patient  Best Call Back Number: [phone]  Additional Information: Patient would like to discuss stem cells. Please call to advise. Thanks!

## 2020-03-12 NOTE — TELEPHONE ENCOUNTER
Spoke with patient advised of what dr rivera said patient is not ready to do stem cell via bone marrow. He will call back should he change his mind

## 2021-11-23 ENCOUNTER — OFFICE VISIT (OUTPATIENT)
Dept: ORTHOPEDICS | Facility: CLINIC | Age: 66
End: 2021-11-23
Payer: MEDICARE

## 2021-11-23 VITALS — HEIGHT: 67 IN | BODY MASS INDEX: 32.96 KG/M2 | WEIGHT: 210 LBS

## 2021-11-23 DIAGNOSIS — G56.02 LEFT CARPAL TUNNEL SYNDROME: Primary | ICD-10-CM

## 2021-11-23 DIAGNOSIS — M19.042 PRIMARY OSTEOARTHRITIS OF LEFT HAND: ICD-10-CM

## 2021-11-23 DIAGNOSIS — G56.02 CARPAL TUNNEL SYNDROME OF LEFT WRIST: ICD-10-CM

## 2021-11-23 DIAGNOSIS — G56.22 ULNAR NERVE ENTRAPMENT AT ELBOW, LEFT: ICD-10-CM

## 2021-11-23 DIAGNOSIS — M54.12 CERVICAL RADICULOPATHY: ICD-10-CM

## 2021-11-23 PROCEDURE — 99213 OFFICE O/P EST LOW 20 MIN: CPT | Mod: S$GLB,,, | Performed by: ORTHOPAEDIC SURGERY

## 2021-11-23 PROCEDURE — 99213 PR OFFICE/OUTPT VISIT, EST, LEVL III, 20-29 MIN: ICD-10-PCS | Mod: S$GLB,,, | Performed by: ORTHOPAEDIC SURGERY

## 2021-11-26 ENCOUNTER — TELEPHONE (OUTPATIENT)
Dept: PAIN MEDICINE | Facility: CLINIC | Age: 66
End: 2021-11-26
Payer: MEDICARE

## 2021-11-29 ENCOUNTER — HOSPITAL ENCOUNTER (OUTPATIENT)
Dept: PREADMISSION TESTING | Facility: HOSPITAL | Age: 66
Discharge: HOME | End: 2021-11-29
Attending: ORTHOPAEDIC SURGERY
Payer: COMMERCIAL

## 2021-11-29 ENCOUNTER — HOSPITAL ENCOUNTER (OUTPATIENT)
Dept: RADIOLOGY | Facility: HOSPITAL | Age: 66
Discharge: HOME | End: 2021-11-29
Attending: ORTHOPAEDIC SURGERY
Payer: COMMERCIAL

## 2021-11-29 VITALS
BODY MASS INDEX: 34.53 KG/M2 | SYSTOLIC BLOOD PRESSURE: 155 MMHG | HEART RATE: 82 BPM | TEMPERATURE: 97 F | WEIGHT: 220 LBS | DIASTOLIC BLOOD PRESSURE: 94 MMHG | OXYGEN SATURATION: 97 % | RESPIRATION RATE: 17 BRPM | HEIGHT: 67 IN

## 2021-11-29 DIAGNOSIS — Z01.818 PRE-OP TESTING: Primary | ICD-10-CM

## 2021-11-29 DIAGNOSIS — G56.22 ULNAR NERVE ENTRAPMENT AT ELBOW, LEFT: ICD-10-CM

## 2021-11-29 DIAGNOSIS — G56.02 LEFT CARPAL TUNNEL SYNDROME: ICD-10-CM

## 2021-11-29 LAB — SARS-COV-2 RDRP RESP QL NAA+PROBE: NEGATIVE

## 2021-11-29 PROCEDURE — 93010 ELECTROCARDIOGRAM REPORT: CPT | Mod: ,,, | Performed by: SPECIALIST

## 2021-11-29 PROCEDURE — 93005 ELECTROCARDIOGRAM TRACING: CPT | Performed by: SPECIALIST

## 2021-11-29 PROCEDURE — U0002 COVID-19 LAB TEST NON-CDC: HCPCS | Performed by: ORTHOPAEDIC SURGERY

## 2021-11-29 PROCEDURE — 71046 X-RAY EXAM CHEST 2 VIEWS: CPT | Mod: TC

## 2021-11-29 PROCEDURE — 93010 EKG 12-LEAD: ICD-10-PCS | Mod: ,,, | Performed by: SPECIALIST

## 2021-11-30 ENCOUNTER — ANESTHESIA EVENT (OUTPATIENT)
Dept: SURGERY | Facility: HOSPITAL | Age: 66
End: 2021-11-30
Payer: MEDICARE

## 2021-11-30 ENCOUNTER — TELEPHONE (OUTPATIENT)
Dept: PHYSICAL MEDICINE AND REHAB | Facility: CLINIC | Age: 66
End: 2021-11-30
Payer: MEDICARE

## 2021-12-01 ENCOUNTER — ANESTHESIA (OUTPATIENT)
Dept: SURGERY | Facility: HOSPITAL | Age: 66
End: 2021-12-01
Payer: MEDICARE

## 2021-12-01 ENCOUNTER — TELEPHONE (OUTPATIENT)
Dept: ORTHOPEDICS | Facility: CLINIC | Age: 66
End: 2021-12-01
Payer: MEDICARE

## 2021-12-01 ENCOUNTER — HOSPITAL ENCOUNTER (OUTPATIENT)
Facility: HOSPITAL | Age: 66
Discharge: HOME | End: 2021-12-01
Attending: ORTHOPAEDIC SURGERY | Admitting: ORTHOPAEDIC SURGERY
Payer: MEDICARE

## 2021-12-01 VITALS
BODY MASS INDEX: 34.53 KG/M2 | TEMPERATURE: 98 F | OXYGEN SATURATION: 98 % | RESPIRATION RATE: 18 BRPM | HEART RATE: 77 BPM | DIASTOLIC BLOOD PRESSURE: 92 MMHG | SYSTOLIC BLOOD PRESSURE: 139 MMHG | HEIGHT: 67 IN | WEIGHT: 220 LBS

## 2021-12-01 DIAGNOSIS — G56.02 LEFT CARPAL TUNNEL SYNDROME: ICD-10-CM

## 2021-12-01 DIAGNOSIS — G56.22 ULNAR NERVE ENTRAPMENT AT ELBOW, LEFT: Primary | ICD-10-CM

## 2021-12-01 DIAGNOSIS — G56.02 CARPAL TUNNEL SYNDROME OF LEFT WRIST: ICD-10-CM

## 2021-12-01 DIAGNOSIS — R93.89 ABNORMAL CHEST X-RAY: Primary | ICD-10-CM

## 2021-12-01 PROCEDURE — 27000671 HC TUBING MICROBORE EXT: Performed by: ANESTHESIOLOGY

## 2021-12-01 PROCEDURE — 36000706: Performed by: ORTHOPAEDIC SURGERY

## 2021-12-01 PROCEDURE — 63600175 PHARM REV CODE 636 W HCPCS: Performed by: NURSE ANESTHETIST, CERTIFIED REGISTERED

## 2021-12-01 PROCEDURE — 63600175 PHARM REV CODE 636 W HCPCS: Performed by: ORTHOPAEDIC SURGERY

## 2021-12-01 PROCEDURE — 71000015 HC POSTOP RECOV 1ST HR: Performed by: ORTHOPAEDIC SURGERY

## 2021-12-01 PROCEDURE — 37000008 HC ANESTHESIA 1ST 15 MINUTES: Performed by: ORTHOPAEDIC SURGERY

## 2021-12-01 PROCEDURE — 27000673 HC TUBING BLOOD Y: Performed by: ANESTHESIOLOGY

## 2021-12-01 PROCEDURE — 25000003 PHARM REV CODE 250: Performed by: NURSE ANESTHETIST, CERTIFIED REGISTERED

## 2021-12-01 PROCEDURE — 27201423 OPTIME MED/SURG SUP & DEVICES STERILE SUPPLY: Performed by: ORTHOPAEDIC SURGERY

## 2021-12-01 PROCEDURE — 63600175 PHARM REV CODE 636 W HCPCS: Performed by: ANESTHESIOLOGY

## 2021-12-01 PROCEDURE — 27202107 HC XP QUATRO SENSOR: Performed by: ANESTHESIOLOGY

## 2021-12-01 PROCEDURE — 71000033 HC RECOVERY, INTIAL HOUR: Performed by: ORTHOPAEDIC SURGERY

## 2021-12-01 PROCEDURE — 36000707: Performed by: ORTHOPAEDIC SURGERY

## 2021-12-01 PROCEDURE — 25000003 PHARM REV CODE 250: Performed by: ANESTHESIOLOGY

## 2021-12-01 PROCEDURE — 63600175 PHARM REV CODE 636 W HCPCS

## 2021-12-01 PROCEDURE — 71000039 HC RECOVERY, EACH ADD'L HOUR: Performed by: ORTHOPAEDIC SURGERY

## 2021-12-01 PROCEDURE — 37000009 HC ANESTHESIA EA ADD 15 MINS: Performed by: ORTHOPAEDIC SURGERY

## 2021-12-01 PROCEDURE — 27200651 HC AIRWAY, LMA: Performed by: ANESTHESIOLOGY

## 2021-12-01 RX ORDER — DIPHENHYDRAMINE HYDROCHLORIDE 50 MG/ML
12.5 INJECTION INTRAMUSCULAR; INTRAVENOUS
Status: DISCONTINUED | OUTPATIENT
Start: 2021-12-01 | End: 2021-12-01 | Stop reason: HOSPADM

## 2021-12-01 RX ORDER — ONDANSETRON 2 MG/ML
INJECTION INTRAMUSCULAR; INTRAVENOUS
Status: DISCONTINUED | OUTPATIENT
Start: 2021-12-01 | End: 2021-12-01

## 2021-12-01 RX ORDER — ACETAMINOPHEN 10 MG/ML
INJECTION, SOLUTION INTRAVENOUS
Status: DISCONTINUED | OUTPATIENT
Start: 2021-12-01 | End: 2021-12-01

## 2021-12-01 RX ORDER — SODIUM CHLORIDE 0.9 % (FLUSH) 0.9 %
10 SYRINGE (ML) INJECTION
Status: DISCONTINUED | OUTPATIENT
Start: 2021-12-01 | End: 2021-12-01 | Stop reason: HOSPADM

## 2021-12-01 RX ORDER — OXYCODONE AND ACETAMINOPHEN 7.5; 325 MG/1; MG/1
1 TABLET ORAL EVERY 4 HOURS PRN
Qty: 28 TABLET | Refills: 0 | Status: SHIPPED | OUTPATIENT
Start: 2021-12-01 | End: 2023-11-30

## 2021-12-01 RX ORDER — DEXAMETHASONE SODIUM PHOSPHATE 4 MG/ML
INJECTION, SOLUTION INTRA-ARTICULAR; INTRALESIONAL; INTRAMUSCULAR; INTRAVENOUS; SOFT TISSUE
Status: DISCONTINUED | OUTPATIENT
Start: 2021-12-01 | End: 2021-12-01

## 2021-12-01 RX ORDER — ONDANSETRON 2 MG/ML
4 INJECTION INTRAMUSCULAR; INTRAVENOUS DAILY PRN
Status: DISCONTINUED | OUTPATIENT
Start: 2021-12-01 | End: 2021-12-01 | Stop reason: HOSPADM

## 2021-12-01 RX ORDER — HYDROMORPHONE HYDROCHLORIDE 1 MG/ML
0.2 INJECTION, SOLUTION INTRAMUSCULAR; INTRAVENOUS; SUBCUTANEOUS
Status: DISCONTINUED | OUTPATIENT
Start: 2021-12-01 | End: 2021-12-01 | Stop reason: HOSPADM

## 2021-12-01 RX ORDER — LIDOCAINE HYDROCHLORIDE 20 MG/ML
INJECTION, SOLUTION EPIDURAL; INFILTRATION; INTRACAUDAL; PERINEURAL
Status: DISCONTINUED | OUTPATIENT
Start: 2021-12-01 | End: 2021-12-01

## 2021-12-01 RX ORDER — MEPERIDINE HYDROCHLORIDE 50 MG/ML
12.5 INJECTION INTRAMUSCULAR; INTRAVENOUS; SUBCUTANEOUS EVERY 10 MIN PRN
Status: DISCONTINUED | OUTPATIENT
Start: 2021-12-01 | End: 2021-12-01 | Stop reason: HOSPADM

## 2021-12-01 RX ORDER — FENTANYL CITRATE 50 UG/ML
INJECTION, SOLUTION INTRAMUSCULAR; INTRAVENOUS
Status: DISCONTINUED | OUTPATIENT
Start: 2021-12-01 | End: 2021-12-01

## 2021-12-01 RX ORDER — HYDROMORPHONE HYDROCHLORIDE 1 MG/ML
INJECTION, SOLUTION INTRAMUSCULAR; INTRAVENOUS; SUBCUTANEOUS
Status: COMPLETED
Start: 2021-12-01 | End: 2021-12-01

## 2021-12-01 RX ORDER — MIDAZOLAM HYDROCHLORIDE 1 MG/ML
INJECTION INTRAMUSCULAR; INTRAVENOUS
Status: DISCONTINUED | OUTPATIENT
Start: 2021-12-01 | End: 2021-12-01

## 2021-12-01 RX ORDER — SODIUM CHLORIDE, SODIUM LACTATE, POTASSIUM CHLORIDE, CALCIUM CHLORIDE 600; 310; 30; 20 MG/100ML; MG/100ML; MG/100ML; MG/100ML
INJECTION, SOLUTION INTRAVENOUS CONTINUOUS PRN
Status: DISCONTINUED | OUTPATIENT
Start: 2021-12-01 | End: 2021-12-01

## 2021-12-01 RX ORDER — PROPOFOL 10 MG/ML
VIAL (ML) INTRAVENOUS
Status: DISCONTINUED | OUTPATIENT
Start: 2021-12-01 | End: 2021-12-01

## 2021-12-01 RX ORDER — LIDOCAINE HYDROCHLORIDE 20 MG/ML
JELLY TOPICAL
Status: DISCONTINUED | OUTPATIENT
Start: 2021-12-01 | End: 2021-12-01

## 2021-12-01 RX ORDER — OXYCODONE HYDROCHLORIDE 5 MG/1
5 TABLET ORAL
Status: DISCONTINUED | OUTPATIENT
Start: 2021-12-01 | End: 2021-12-01 | Stop reason: HOSPADM

## 2021-12-01 RX ORDER — CEFAZOLIN SODIUM 2 G/50ML
2 SOLUTION INTRAVENOUS
Status: COMPLETED | OUTPATIENT
Start: 2021-12-01 | End: 2021-12-01

## 2021-12-01 RX ADMIN — SODIUM CHLORIDE, SODIUM LACTATE, POTASSIUM CHLORIDE, AND CALCIUM CHLORIDE: .6; .31; .03; .02 INJECTION, SOLUTION INTRAVENOUS at 06:12

## 2021-12-01 RX ADMIN — PROPOFOL 140 MG: 10 INJECTION, EMULSION INTRAVENOUS at 07:12

## 2021-12-01 RX ADMIN — CEFAZOLIN SODIUM 2 G: 2 SOLUTION INTRAVENOUS at 07:12

## 2021-12-01 RX ADMIN — ACETAMINOPHEN 1000 MG: 10 INJECTION, SOLUTION INTRAVENOUS at 06:12

## 2021-12-01 RX ADMIN — HYDROMORPHONE HYDROCHLORIDE 0.2 MG: 1 INJECTION, SOLUTION INTRAMUSCULAR; INTRAVENOUS; SUBCUTANEOUS at 07:12

## 2021-12-01 RX ADMIN — HYDROMORPHONE HYDROCHLORIDE 0.2 MG: 1 INJECTION, SOLUTION INTRAMUSCULAR; INTRAVENOUS; SUBCUTANEOUS at 08:12

## 2021-12-01 RX ADMIN — LIDOCAINE HYDROCHLORIDE 5 ML: 20 JELLY TOPICAL at 07:12

## 2021-12-01 RX ADMIN — DEXAMETHASONE SODIUM PHOSPHATE 8 MG: 4 INJECTION, SOLUTION INTRAMUSCULAR; INTRAVENOUS at 07:12

## 2021-12-01 RX ADMIN — MIDAZOLAM HYDROCHLORIDE 1 MG: 1 INJECTION, SOLUTION INTRAMUSCULAR; INTRAVENOUS at 06:12

## 2021-12-01 RX ADMIN — ONDANSETRON 4 MG: 2 INJECTION INTRAMUSCULAR; INTRAVENOUS at 06:12

## 2021-12-01 RX ADMIN — FENTANYL CITRATE 50 MCG: 50 INJECTION INTRAMUSCULAR; INTRAVENOUS at 06:12

## 2021-12-01 RX ADMIN — OXYCODONE HYDROCHLORIDE 5 MG: 5 TABLET ORAL at 08:12

## 2021-12-01 RX ADMIN — FENTANYL CITRATE 50 MCG: 50 INJECTION INTRAMUSCULAR; INTRAVENOUS at 07:12

## 2021-12-01 RX ADMIN — LIDOCAINE HYDROCHLORIDE 80 MG: 20 INJECTION, SOLUTION INTRAVENOUS at 07:12

## 2021-12-03 ENCOUNTER — TELEPHONE (OUTPATIENT)
Dept: ORTHOPEDICS | Facility: CLINIC | Age: 66
End: 2021-12-03
Payer: MEDICARE

## 2021-12-06 ENCOUNTER — HOSPITAL ENCOUNTER (OUTPATIENT)
Dept: RADIOLOGY | Facility: HOSPITAL | Age: 66
Discharge: HOME | End: 2021-12-06
Attending: ORTHOPAEDIC SURGERY
Payer: MEDICARE

## 2021-12-06 DIAGNOSIS — R93.89 ABNORMAL CHEST X-RAY: ICD-10-CM

## 2021-12-06 PROCEDURE — 71250 CT THORAX DX C-: CPT | Mod: TC,PO

## 2021-12-15 ENCOUNTER — OFFICE VISIT (OUTPATIENT)
Dept: ORTHOPEDICS | Facility: CLINIC | Age: 66
End: 2021-12-15
Payer: COMMERCIAL

## 2021-12-15 VITALS — HEIGHT: 67 IN | WEIGHT: 212 LBS | BODY MASS INDEX: 33.27 KG/M2

## 2021-12-15 DIAGNOSIS — Z98.890 S/P CARPAL TUNNEL RELEASE: Primary | ICD-10-CM

## 2021-12-15 DIAGNOSIS — Z98.890 S/P CUBITAL TUNNEL RELEASE: ICD-10-CM

## 2021-12-15 DIAGNOSIS — M75.42 IMPINGEMENT SYNDROME OF LEFT SHOULDER: ICD-10-CM

## 2021-12-15 PROCEDURE — 99024 POSTOP FOLLOW-UP VISIT: CPT | Mod: S$GLB,,, | Performed by: PHYSICIAN ASSISTANT

## 2021-12-15 PROCEDURE — 99024 PR POST-OP FOLLOW-UP VISIT: ICD-10-PCS | Mod: S$GLB,,, | Performed by: PHYSICIAN ASSISTANT

## 2021-12-15 RX ORDER — OMEPRAZOLE 40 MG/1
CAPSULE, DELAYED RELEASE ORAL
COMMUNITY
Start: 2021-07-16

## 2022-02-07 ENCOUNTER — TELEPHONE (OUTPATIENT)
Dept: ORTHOPEDICS | Facility: CLINIC | Age: 67
End: 2022-02-07
Payer: MEDICARE

## 2022-02-07 DIAGNOSIS — M75.42 IMPINGEMENT SYNDROME OF BOTH SHOULDERS: Primary | ICD-10-CM

## 2022-02-07 DIAGNOSIS — M75.41 IMPINGEMENT SYNDROME OF BOTH SHOULDERS: Primary | ICD-10-CM

## 2022-02-07 NOTE — TELEPHONE ENCOUNTER
----- Message from Astrid Hendrickson sent at 2/7/2022  2:26 PM CST -----  Liliane w/[facility] called requesting an order for PT for Pt's bilat shoulders. Pt phone # [phone]. -BRANDI

## 2023-05-31 DIAGNOSIS — M50.223 HERNIATED NUCLEUS PULPOSUS, C6-7: ICD-10-CM

## 2023-05-31 DIAGNOSIS — M50.222 HERNIATED NUCLEUS PULPOSUS, C5-6: Primary | ICD-10-CM

## 2023-06-12 ENCOUNTER — HOSPITAL ENCOUNTER (OUTPATIENT)
Dept: RADIOLOGY | Facility: HOSPITAL | Age: 68
Discharge: HOME | End: 2023-06-12
Attending: NEUROLOGICAL SURGERY
Payer: MEDICARE

## 2023-06-12 DIAGNOSIS — M50.223 HERNIATED NUCLEUS PULPOSUS, C6-7: ICD-10-CM

## 2023-06-12 DIAGNOSIS — M50.222 HERNIATED NUCLEUS PULPOSUS, C5-6: ICD-10-CM

## 2023-06-12 PROCEDURE — 72050 X-RAY EXAM NECK SPINE 4/5VWS: CPT | Mod: TC,PO

## 2023-06-12 PROCEDURE — 72141 MRI NECK SPINE W/O DYE: CPT | Mod: TC,PO

## 2023-10-11 ENCOUNTER — PATIENT OUTREACH (OUTPATIENT)
Dept: ADMINISTRATIVE | Facility: HOSPITAL | Age: 68
End: 2023-10-11
Payer: MEDICARE

## 2023-10-11 NOTE — PROGRESS NOTES
Population Health Chart Review & Patient Outreach Details:     Reason for Outreach Encounter:     [x]  Non-Compliant Report   []  Payor Report ([employer], PHN, BCBS, MSSP, MCIP, C, etc.)   []  Pre-Visit Chart Review     Updates Requested / Reviewed:     [x]  Care Everywhere    []     []  External Sources ([facility], [facility], DIS, etc.)   [x]  Care Team Updated    Patient Outreach Method:    [x]  Telephone Outreach Completed   [] Successful   [x] Left Voicemail   [] Unable to Contact (wrong number, no voicemail)  []  [facility] Outreach Sent  [x]  Letter Outreach Mailed  []  Fax Sent for External Records  []  External Records Upload    Health Maintenance Topics Addressed and Outreach Outcomes / Actions Taken:        []      Breast Cancer Screening []  Mammo Scheduled      []  External Records Requested     []  Added Reminder to Complete to Upcoming Primary Care Appt Notes     []  Patient Declined     []  Patient Will Call Back to Schedule     []  Patient Will Schedule with External Provider / Order Routed if Applicable             []       Cervical Cancer Screening []  Pap Scheduled      []  External Records Requested     []  Added Reminder to Complete to Upcoming Primary Care Appt Notes     []  Patient Declined     []  Patient Will Call Back to Schedule     []  Patient Will Schedule with External Provider               []          Colorectal Cancer Screening []  Colonoscopy Case Request or Referral Placed     []  External Records Requested     []  Added Reminder to Complete to Upcoming Primary Care Appt Notes     []  Patient Declined     []  Patient Will Call Back to Schedule     []  Patient Will Schedule with External Provider     []  Fit Kit Mailed (add the SmartPhrase under additional notes)     []  Reminded Patient to Complete Home Test             []      Diabetic Eye Exam []  Eye Camera Scheduled or Optometry Referral Placed     []  External Records Requested     []  Added Reminder to Complete to  Upcoming Primary Care Appt Notes     []  Patient Declined     []  Patient Will Call Back to Schedule     []  Patient Will Schedule with External Provider             []      Blood Pressure Control []  Primary Care Follow Up Visit Scheduled     []  Remote Blood Pressure Reading Captured     []  Added Reminder to Complete to Upcoming Primary Care Appt Notes     []  Patient Declined     []  Patient Will Call Back / Patient Will Send Portal Message with Reading     []  Patient Will Call Back to Schedule Provider Visit             []       HbA1c & Other Labs []  Lab Appt Scheduled for Due Labs     []  Primary Care Follow Up Visit Scheduled      []  Reminded Patient to Complete Home Test     []  Added Reminder to Complete to Upcoming Primary Care Appt Notes     []  Patient Declined     []  Patient Will Call Back to Schedule     []  Patient Will Schedule with External Provider / Order Routed if Applicable           [x]    Schedule Primary Care Appt []  Primary Care Appt Scheduled     []  Patient Declined     []  Patient Will Call Back to Schedule     []  Pt Established with External Provider & Updated Care Team             []      Medication Adherence []  Primary Care Appointment Scheduled     []  Added Reminder to Upcoming Primary Care Appt Notes     []  Patient Reminded to  Prescription     []  Patient Declined, Provider Notified if Needed     []  Sent Provider Message to Review and/or Add Exclusion to Problem List             []      Osteoporosis Screening []  DXA Appointment Scheduled     []  External Records Requested     []  Added Reminder to Complete to Upcoming Primary Care Appt Notes     []  Patient Declined     []  Patient Will Call Back to Schedule     []  Patient Will Schedule with External Provider / Order Routed if Applicable     Additional Care Coordinator Notes:         Further Action Needed If Patient Returns Outreach:

## 2023-10-11 NOTE — LETTER
October 11, 2023    Von Griffin Jr.  [address] [facility] [address]             [facility]  [address]  [facility] [address]  Phone: [phone] Dear Blaise, [facility] is committed to your overall health.  We would like to assist you in choosing a Primary Care Physician.  Please call [phone] to schedule a routine checkup and establish care with one of our many Preferred Providers.  If you have a new Health Care Provider and would like to notify us, we will update your records for accuracy.      We appreciate the opportunity to provide you with excellent medical care.      Sincerely,    Iglesia MALDONADO  Clinical Care Coordinator

## 2023-10-16 ENCOUNTER — TELEPHONE (OUTPATIENT)
Dept: GASTROENTEROLOGY | Facility: CLINIC | Age: 68
End: 2023-10-16
Payer: MEDICARE

## 2023-10-16 NOTE — TELEPHONE ENCOUNTER
Returned call to patient to assist. Patient would like Dr. Guajardo. Advised him that provider does not work in that clinic.

## 2023-10-16 NOTE — TELEPHONE ENCOUNTER
----- Message from Iveth Tolliver sent at 10/16/2023  2:14 PM CDT -----  Regarding: Appointment Request  Contact: patient at [phone]  Type:  Appointment Request    Name of Caller:  patient at [phone]    Symptoms:  colonoscopy  Additional Information:  Please call and advise. Thank you

## 2023-10-17 ENCOUNTER — TELEPHONE (OUTPATIENT)
Dept: GASTROENTEROLOGY | Facility: CLINIC | Age: 68
End: 2023-10-17
Payer: MEDICARE

## 2023-10-17 NOTE — TELEPHONE ENCOUNTER
----- Message from Yisel Petersen sent at 10/16/2023  5:21 PM CDT -----  Type: General Call Back     Name of Caller:ISAURO CHANDLER JR. [[id number]]  Symptoms:colonoscopy  Would the patient rather a call back or a response via [facility]? Call back   Best Call Back Number:[phone]  Additional Information: Patient indicates he received a call from the providers office in regards to scheduling his colonoscopy with the provider. Patient indicates he decided he would like to   schedule the appointment for the soonest appointment. Patient would like a call back with further assistance and more information.

## 2023-10-18 ENCOUNTER — PATIENT MESSAGE (OUTPATIENT)
Dept: GASTROENTEROLOGY | Facility: CLINIC | Age: 68
End: 2023-10-18
Payer: MEDICARE

## 2023-10-18 DIAGNOSIS — Z12.11 ENCOUNTER FOR SCREENING COLONOSCOPY: Primary | ICD-10-CM

## 2023-11-30 ENCOUNTER — OFFICE VISIT (OUTPATIENT)
Dept: OTOLARYNGOLOGY | Facility: CLINIC | Age: 68
End: 2023-11-30
Payer: MEDICARE

## 2023-11-30 VITALS
DIASTOLIC BLOOD PRESSURE: 109 MMHG | HEIGHT: 67 IN | WEIGHT: 216.5 LBS | HEART RATE: 88 BPM | SYSTOLIC BLOOD PRESSURE: 175 MMHG | BODY MASS INDEX: 33.98 KG/M2

## 2023-11-30 DIAGNOSIS — J01.00 ACUTE NON-RECURRENT MAXILLARY SINUSITIS: Primary | ICD-10-CM

## 2023-11-30 PROCEDURE — 3077F PR MOST RECENT SYSTOLIC BLOOD PRESSURE >= 140 MM HG: ICD-10-PCS | Mod: CPTII,S$GLB,, | Performed by: PHYSICIAN ASSISTANT

## 2023-11-30 PROCEDURE — 1159F PR MEDICATION LIST DOCUMENTED IN MEDICAL RECORD: ICD-10-PCS | Mod: CPTII,S$GLB,, | Performed by: PHYSICIAN ASSISTANT

## 2023-11-30 PROCEDURE — 1101F PT FALLS ASSESS-DOCD LE1/YR: CPT | Mod: CPTII,S$GLB,, | Performed by: PHYSICIAN ASSISTANT

## 2023-11-30 PROCEDURE — 1160F RVW MEDS BY RX/DR IN RCRD: CPT | Mod: CPTII,S$GLB,, | Performed by: PHYSICIAN ASSISTANT

## 2023-11-30 PROCEDURE — 99203 PR OFFICE/OUTPT VISIT, NEW, LEVL III, 30-44 MIN: ICD-10-PCS | Mod: S$GLB,,, | Performed by: PHYSICIAN ASSISTANT

## 2023-11-30 PROCEDURE — 3008F BODY MASS INDEX DOCD: CPT | Mod: CPTII,S$GLB,, | Performed by: PHYSICIAN ASSISTANT

## 2023-11-30 PROCEDURE — 3288F PR FALLS RISK ASSESSMENT DOCUMENTED: ICD-10-PCS | Mod: CPTII,S$GLB,, | Performed by: PHYSICIAN ASSISTANT

## 2023-11-30 PROCEDURE — 3008F PR BODY MASS INDEX (BMI) DOCUMENTED: ICD-10-PCS | Mod: CPTII,S$GLB,, | Performed by: PHYSICIAN ASSISTANT

## 2023-11-30 PROCEDURE — 1160F PR REVIEW ALL MEDS BY PRESCRIBER/CLIN PHARMACIST DOCUMENTED: ICD-10-PCS | Mod: CPTII,S$GLB,, | Performed by: PHYSICIAN ASSISTANT

## 2023-11-30 PROCEDURE — 99203 OFFICE O/P NEW LOW 30 MIN: CPT | Mod: S$GLB,,, | Performed by: PHYSICIAN ASSISTANT

## 2023-11-30 PROCEDURE — 99999 PR PBB SHADOW E&M-EST. PATIENT-LVL IV: CPT | Mod: PBBFAC,,, | Performed by: PHYSICIAN ASSISTANT

## 2023-11-30 PROCEDURE — 3077F SYST BP >= 140 MM HG: CPT | Mod: CPTII,S$GLB,, | Performed by: PHYSICIAN ASSISTANT

## 2023-11-30 PROCEDURE — 99999 PR PBB SHADOW E&M-EST. PATIENT-LVL IV: ICD-10-PCS | Mod: PBBFAC,,, | Performed by: PHYSICIAN ASSISTANT

## 2023-11-30 PROCEDURE — 1125F AMNT PAIN NOTED PAIN PRSNT: CPT | Mod: CPTII,S$GLB,, | Performed by: PHYSICIAN ASSISTANT

## 2023-11-30 PROCEDURE — 1125F PR PAIN SEVERITY QUANTIFIED, PAIN PRESENT: ICD-10-PCS | Mod: CPTII,S$GLB,, | Performed by: PHYSICIAN ASSISTANT

## 2023-11-30 PROCEDURE — 3288F FALL RISK ASSESSMENT DOCD: CPT | Mod: CPTII,S$GLB,, | Performed by: PHYSICIAN ASSISTANT

## 2023-11-30 PROCEDURE — 1159F MED LIST DOCD IN RCRD: CPT | Mod: CPTII,S$GLB,, | Performed by: PHYSICIAN ASSISTANT

## 2023-11-30 PROCEDURE — 1101F PR PT FALLS ASSESS DOC 0-1 FALLS W/OUT INJ PAST YR: ICD-10-PCS | Mod: CPTII,S$GLB,, | Performed by: PHYSICIAN ASSISTANT

## 2023-11-30 PROCEDURE — 3080F PR MOST RECENT DIASTOLIC BLOOD PRESSURE >= 90 MM HG: ICD-10-PCS | Mod: CPTII,S$GLB,, | Performed by: PHYSICIAN ASSISTANT

## 2023-11-30 PROCEDURE — 3080F DIAST BP >= 90 MM HG: CPT | Mod: CPTII,S$GLB,, | Performed by: PHYSICIAN ASSISTANT

## 2023-11-30 RX ORDER — IBUPROFEN 800 MG/1
800 TABLET ORAL EVERY 6 HOURS PRN
COMMUNITY
Start: 2023-08-01

## 2023-11-30 RX ORDER — PENICILLIN V POTASSIUM 500 MG/1
500 TABLET, FILM COATED ORAL EVERY 6 HOURS
Status: ON HOLD | COMMUNITY
Start: 2023-11-09 | End: 2023-12-20

## 2023-11-30 RX ORDER — ANASTROZOLE 1 MG/1
1 TABLET ORAL
COMMUNITY
Start: 2023-09-28

## 2023-11-30 RX ORDER — TESTOSTERONE CYPIONATE 200 MG/ML
INJECTION, SOLUTION INTRAMUSCULAR
COMMUNITY
Start: 2023-09-27

## 2023-11-30 RX ORDER — CHLORHEXIDINE GLUCONATE ORAL RINSE 1.2 MG/ML
SOLUTION DENTAL
COMMUNITY
Start: 2023-11-27

## 2023-11-30 RX ORDER — LEVOCETIRIZINE DIHYDROCHLORIDE 5 MG/1
5 TABLET, FILM COATED ORAL NIGHTLY
Qty: 30 TABLET | Refills: 2 | Status: SHIPPED | OUTPATIENT
Start: 2023-11-30 | End: 2024-11-29

## 2023-11-30 NOTE — PATIENT INSTRUCTIONS
Complete penicillin prescription as prescribed.  Use flonase 1 spray to each nostril twice daily. Point up and slightly toward ear. Use xyzal 5mg in the evening.     Use NeilMed sinus rinse two times daily. Use distilled water. Do before using flonase. Follow up in 2 weeks to see how you are doing after completing above therapy.           DIRECTIONS FOR SINUS SALINE RINSE To see demonstration: Enter http://www.[employer].com/watch?v=[email or web address] into the browser address box, or go to You tube, and under the search box, enter sinus rinse. Click on NeilMed Sinus Rinse Video    Step 1    Step 1 Please wash your hands. Fill the clean bottle with the designated volume of warm distilled water, filtered water or previously boiled water. You may warm the water in a microwave but we recommend that you warm it in increments of five seconds. This is to avoid excessive heating of the water and damage to the device or scalding your nasal passage.    Step 2    Step 2 Cut the SINUS RINSE mixture packet at the corner and pour its contents into the bottle. Tighten the cap & tube on the bottle securely, place one finger over the tip of the cap and shake the bottle gently to dissolve the mixture.      Step 3    Step 3 Standing in front of a sink, bend forward to your comfort level and tilt your head down. Keeping your mouth open without holding your breath, place cap snugly against your nasal passage and SQUEEZE BOTTLE GENTLY until the solution starts draining from the OPPOSITE nasal passage or from your mouth. Keep squeezing the bottle GENTLY until at least 1/4 to 1/2 (60 to 120 mL) of the bottle is used for a proper rinse. Do not swallow the solution.    Step 4    Blow your nose gently, without pinching your nose completely because this will apply pressure on the eardrums. If tolerable, sniff in any residual solution remaining in the nasal passage once or twice prior to blowing your nose as this may clean out the posterior  nasopharyngeal area (the area at the back of your nasal passage). Some solution will reach the back of the throat, so please spit it out. To help improve drainage of any residual solution, blow your nose gently while tilting your head to the opposite side of the nasal passage that you just rinsed.    Step 5    Now repeat steps 3 & 4 for your other nasal passage.    Step 6     Air dry the Sinus Rinse bottle, cap, and tube on a clean paper towel, a glass plate to store the bottle cap and tube. If there is any solution leftover, please discard it. We recommend you make a fresh solution each time you rinse. Rinse 5 times each day, OR as directed by your physician. Warnings: DO NOT RINSE IF NASAL PASSAGE IS COMPLETELY BLOCKED OR IF YOU HAVE AN EAR INFECTION OR BLOCKED EARS. If you have had ear surgery, please contact your physician prior to irrigation. If you experience any pressure in your ears, stop the rinse and get further directions from your physician or contact our office during regular business hours. To avoid any ear discomfort: Heat the solution to lukewarm, do not use hot, boiling or cold water. Keep your mouth open. Do not hold your breath and if possible make the sound KERVIN....KERVIN... Make sure to take the position as shown. Gently squeeze 1/4 of the bottle at a time (60mL / 2 ounces). Stop the rinse if you feel any solution sensation near the ears. You may rinse with a partially blocked nasal passage. Please do not use for any other purposes. Please rinse at least ONE HOUR PRIOR to bedtime, in order to avoid any residual solution dripping in the throat.    >> Before using the SINUS RINSE kit, please inspect the cap, tube and bottle carefully for wear and tear. If any of the components appear discolored or cracked, please contact [facility] to obtain a replacement. You must follow the cleaning instructions provided in this brochure or cleaning instruction card prior to each use.    >> The SINUS RINSE bottle  and mixture are to be used only for nasalirrigation. Do not use for any other purposes.    >> We recommend that you use the rinse ONE HOUR PRIOR to bedtime in order to avoid any residual solution dripping in the throat.    Tips to avoid ear discomfort while rinsing    If you have had ear surgery, please contact your physician prior to irrigation. Do not use if you have an ear infection or blocked ears. Rinse with lukewarm water. Keep your mouth open. Do not hold your breath while rinsing. While rinsing, make sure to tilt your. Gently squeeze the bottle while rinsing; do not squeeze the bottle very forcefully. Stop the rinse if you feel a sensation of fluid near your ears.    Tips to avoid unexpected drainage after rinsing    In rare situations, especially if you have had sinus surgery, the saline solution can pool in the sinus cavities and nasal passages and then drip from your nostrils hours after rinsing. To avoid this harmless but annoying inconvenience, take one extra step after rinsing: lean forward, tilt your head sideways and gently blow your nose. Then, tilt your head to the other side and blow again. You may need to repeat this several times. This will help rid your nasal passages of any excess mucus and remaining saline solution. If you find yourself experiencing delayed drainage often, do not rinse right before leaving your house or going to bed.

## 2023-11-30 NOTE — PROGRESS NOTES
[facility]    Subjective:      Patient: Von Griffin Jr. Patient PCP: Adele, Primary Doctor         :  1955     Sex:  male      MRN:  [id number]          Date of Visit: 2023      Chief Complaint: Sinusitis    Patient ID: Von Griffin Jr. is a 68 y.o. male who presents to clinic for evaluation of sinusitis. Pt on penicillin V 500mg q6h x 10 days. Pt just started abx 2 days ago. Pt states that he has had pain above teeth (left upper teeth) and has left maxillary sinus pain started around 5-6 days. Pt had dental work, but this sinus pain is outside of his typeical dental work. Pt advised by his dentist that he had sinus infection. He was advised that it was not odontogenic in origin. Pt has had inflammation and irritation of left upper gingiva. Pt denies sinus infection in the last 12 months prior to this. Pt denies fever/chills, nasal congestion or purulent nasal discharge. Pt has high blood pressure today. He states that it is usually in the 130s/80s. Pt states that he is on daytime mucinex. He has flonase at home. Pt has issues with allergies throughout the year.    Past Medical History  He has a past medical history of CTS (carpal tunnel syndrome).    Family History  His family history is not on file.    Past Surgical History:   Procedure Laterality Date    CARPAL TUNNEL RELEASE      CARPAL TUNNEL RELEASE Left 2021    Procedure: RELEASE, CARPAL TUNNEL;  Surgeon: Baldo Lam MD;  Location: [facility];  Service: Orthopedics;  Laterality: Left;    COLONOSCOPY N/A 2015    Procedure: COLONOSCOPY;  Surgeon: Darin Gutierrez MD;  Location: [facility];  Service: Endoscopy;  Laterality: N/A;    EYE SURGERY      Eye Lid    TONSILLECTOMY      ULNAR TUNNEL RELEASE Left 2021    Procedure: RELEASE, CUBITAL TUNNEL;  Surgeon: Baldo Lam MD;  Location: [facility] OR;  Service: Orthopedics;  Laterality: Left;     Social History     Tobacco Use    Smoking status: Former     Current packs/day: 25.00      "Types: Cigarettes    Smokeless tobacco: Never    Tobacco comments:     quit 1985   Substance and Sexual Activity    Alcohol use: Yes     Comment: Socially    Drug use: No    Sexual activity: Not on file     Medications  He has a current medication list which includes the following prescription(s): anastrozole, chlorhexidine, ibuprofen, omeprazole, penicillin v potassium, and testosterone cypionate.    Review of patient's allergies indicates:  No Known Allergies  All medications, allergies, and past history have been reviewed.    Objective:      Vitals:      12/1/2021     5:45 AM 12/15/2021    11:28 AM 11/30/2023    10:43 AM   Vitals - 1 value per visit   SYSTOLIC 158     DIASTOLIC 94     Pulse 87     Temp 98.8 °F (37.1 °C)     Resp 16     SPO2 97 %     Weight (lb) 220 212 216.49   Weight (kg) 99.79 96.163 98.2   Height 5' 7" (1.702 m) 5' 7" (1.702 m) 5' 7" (1.702 m)   BMI (Calculated) 34.4 33.2 33.9   Pain Score  Zero Five       Body surface area is 2.15 meters squared.  Physical Exam  Constitutional:       General: He is not in acute distress.     Appearance: Normal appearance. He is not ill-appearing.   HENT:      Head: Normocephalic and atraumatic.      Right Ear: Tympanic membrane, ear canal and external ear normal.      Left Ear: Tympanic membrane, ear canal and external ear normal.      Nose: Septal deviation present.      Mouth/Throat:      Lips: Pink. No lesions.      Mouth: Mucous membranes are moist. No oral lesions.      Dentition: Abnormal dentition (inflammation of left upper gingiva).      Tongue: No lesions.      Palate: No lesions.      Pharynx: Oropharynx is clear. Uvula midline. No pharyngeal swelling, oropharyngeal exudate, posterior oropharyngeal erythema or uvula swelling.   Eyes:      General:         Right eye: No discharge.         Left eye: No discharge.      Extraocular Movements: Extraocular movements intact.      Conjunctiva/sclera: Conjunctivae normal.   Pulmonary:      Effort: Pulmonary " effort is normal.   Neurological:      General: No focal deficit present.      Mental Status: He is alert and oriented to person, place, and time. Mental status is at baseline.   Psychiatric:         Mood and Affect: Mood normal.         Behavior: Behavior normal.         Thought Content: Thought content normal.         Judgment: Judgment normal.       Labs:  WBC   Date Value Ref Range Status   11/29/2021 9.50 3.90 - 12.70 K/uL Final     Eosinophil %   Date Value Ref Range Status   11/29/2021 10.5 (H) 0.0 - 8.0 % Final     Eos #   Date Value Ref Range Status   11/29/2021 1.0 (H) 0.0 - 0.5 K/uL Final     Platelets   Date Value Ref Range Status   11/29/2021 268 150 - 450 K/uL Final     Glucose   Date Value Ref Range Status   11/29/2021 156 (H) 70 - 110 mg/dL Final     All lab results, imaging results, and data have been reviewed.    Assessment:        ICD-10-CM ICD-9-CM   1. Acute non-recurrent maxillary sinusitis  J01.00 461.0            Plan:      Acute non-recurrent maxillary sinusitis  -     Complete penicillin prescription as prescribed. Use flonase 1 spray to each nostril twice daily. Point up and slightly toward ear. Use xyzal 5mg in the evening. Use NeilMed sinus rinse two times daily. Use distilled water. Do before using flonase. Follow up in 2 weeks to see how you are doing after completing above therapy. Follow up in 2-3 weeks to ensure that sinus symptoms are resolving. If not, then we may proceed with CT maxillofacial to further ensure he is not suffering from odontogenic sinusitis.

## 2023-12-20 ENCOUNTER — ANESTHESIA (OUTPATIENT)
Dept: ENDOSCOPY | Facility: HOSPITAL | Age: 68
End: 2023-12-20
Payer: MEDICARE

## 2023-12-20 ENCOUNTER — ANESTHESIA EVENT (OUTPATIENT)
Dept: ENDOSCOPY | Facility: HOSPITAL | Age: 68
End: 2023-12-20
Payer: MEDICARE

## 2023-12-20 ENCOUNTER — HOSPITAL ENCOUNTER (OUTPATIENT)
Facility: HOSPITAL | Age: 68
Discharge: HOME | End: 2023-12-20
Attending: INTERNAL MEDICINE | Admitting: INTERNAL MEDICINE
Payer: MEDICARE

## 2023-12-20 VITALS
DIASTOLIC BLOOD PRESSURE: 98 MMHG | WEIGHT: 204 LBS | HEART RATE: 76 BPM | HEIGHT: 67 IN | RESPIRATION RATE: 16 BRPM | BODY MASS INDEX: 32.02 KG/M2 | OXYGEN SATURATION: 97 % | SYSTOLIC BLOOD PRESSURE: 162 MMHG | TEMPERATURE: 98 F

## 2023-12-20 DIAGNOSIS — K63.5 POLYP OF COLON, UNSPECIFIED PART OF COLON, UNSPECIFIED TYPE: Primary | ICD-10-CM

## 2023-12-20 DIAGNOSIS — K64.8 INTERNAL HEMORRHOIDS: ICD-10-CM

## 2023-12-20 DIAGNOSIS — Z12.11 SCREENING FOR COLON CANCER: ICD-10-CM

## 2023-12-20 DIAGNOSIS — K57.90 DIVERTICULOSIS: ICD-10-CM

## 2023-12-20 PROCEDURE — 45385 COLONOSCOPY W/LESION REMOVAL: CPT | Mod: 22 | Performed by: INTERNAL MEDICINE

## 2023-12-20 PROCEDURE — 45381 PR COLONOSCPY,FLEX,W/DIR SUBMUC INJECT: ICD-10-PCS | Mod: 22,PT,, | Performed by: INTERNAL MEDICINE

## 2023-12-20 PROCEDURE — 45380 COLONOSCOPY AND BIOPSY: CPT | Mod: 22,59 | Performed by: INTERNAL MEDICINE

## 2023-12-20 PROCEDURE — 37000009 HC ANESTHESIA EA ADD 15 MINS: Performed by: INTERNAL MEDICINE

## 2023-12-20 PROCEDURE — 27201012 HC FORCEPS, HOT/COLD, DISP: Performed by: INTERNAL MEDICINE

## 2023-12-20 PROCEDURE — 27201089 HC SNARE, DISP (ANY): Performed by: INTERNAL MEDICINE

## 2023-12-20 PROCEDURE — D9220A PRA ANESTHESIA: Mod: PT,CRNA,, | Performed by: NURSE ANESTHETIST, CERTIFIED REGISTERED

## 2023-12-20 PROCEDURE — D9220A PRA ANESTHESIA: ICD-10-PCS | Mod: PT,ANES,, | Performed by: ANESTHESIOLOGY

## 2023-12-20 PROCEDURE — 63600175 PHARM REV CODE 636 W HCPCS: Performed by: NURSE ANESTHETIST, CERTIFIED REGISTERED

## 2023-12-20 PROCEDURE — D9220A PRA ANESTHESIA: ICD-10-PCS | Mod: PT,CRNA,, | Performed by: NURSE ANESTHETIST, CERTIFIED REGISTERED

## 2023-12-20 PROCEDURE — 45380 PR COLONOSCOPY,BIOPSY: ICD-10-PCS | Mod: 22,59,PT, | Performed by: INTERNAL MEDICINE

## 2023-12-20 PROCEDURE — 88305 TISSUE EXAM BY PATHOLOGIST: CPT | Mod: TC,59 | Performed by: PATHOLOGY

## 2023-12-20 PROCEDURE — 45385 PR COLONOSCOPY,REMV LESN,SNARE: ICD-10-PCS | Mod: 22,PT,, | Performed by: INTERNAL MEDICINE

## 2023-12-20 PROCEDURE — 45380 COLONOSCOPY AND BIOPSY: CPT | Mod: 22,59,PT, | Performed by: INTERNAL MEDICINE

## 2023-12-20 PROCEDURE — 25000003 PHARM REV CODE 250: Performed by: INTERNAL MEDICINE

## 2023-12-20 PROCEDURE — 45381 COLONOSCOPY SUBMUCOUS NJX: CPT | Mod: 22,PT,, | Performed by: INTERNAL MEDICINE

## 2023-12-20 PROCEDURE — 25000003 PHARM REV CODE 250: Performed by: NURSE ANESTHETIST, CERTIFIED REGISTERED

## 2023-12-20 PROCEDURE — 27201028 HC NEEDLE, SCLERO: Performed by: INTERNAL MEDICINE

## 2023-12-20 PROCEDURE — 37000008 HC ANESTHESIA 1ST 15 MINUTES: Performed by: INTERNAL MEDICINE

## 2023-12-20 PROCEDURE — 45381 COLONOSCOPY SUBMUCOUS NJX: CPT | Mod: 22 | Performed by: INTERNAL MEDICINE

## 2023-12-20 PROCEDURE — 27200997: Performed by: INTERNAL MEDICINE

## 2023-12-20 PROCEDURE — D9220A PRA ANESTHESIA: Mod: PT,ANES,, | Performed by: ANESTHESIOLOGY

## 2023-12-20 PROCEDURE — 45385 COLONOSCOPY W/LESION REMOVAL: CPT | Mod: 22,PT,, | Performed by: INTERNAL MEDICINE

## 2023-12-20 RX ORDER — LIDOCAINE HYDROCHLORIDE 20 MG/ML
INJECTION INTRAVENOUS
Status: DISCONTINUED | OUTPATIENT
Start: 2023-12-20 | End: 2023-12-20

## 2023-12-20 RX ORDER — PROPOFOL 10 MG/ML
INJECTION, EMULSION INTRAVENOUS
Status: DISCONTINUED | OUTPATIENT
Start: 2023-12-20 | End: 2023-12-20

## 2023-12-20 RX ORDER — SODIUM CHLORIDE 9 MG/ML
INJECTION, SOLUTION INTRAVENOUS CONTINUOUS
Status: DISCONTINUED | OUTPATIENT
Start: 2023-12-20 | End: 2023-12-20 | Stop reason: HOSPADM

## 2023-12-20 RX ADMIN — PROPOFOL 25 MG: 10 INJECTION, EMULSION INTRAVENOUS at 12:12

## 2023-12-20 RX ADMIN — PROPOFOL 150 MG: 10 INJECTION, EMULSION INTRAVENOUS at 11:12

## 2023-12-20 RX ADMIN — SODIUM CHLORIDE: 0.9 INJECTION, SOLUTION INTRAVENOUS at 11:12

## 2023-12-20 RX ADMIN — PROPOFOL 50 MG: 10 INJECTION, EMULSION INTRAVENOUS at 11:12

## 2023-12-20 RX ADMIN — LIDOCAINE HYDROCHLORIDE 100 MG: 20 INJECTION, SOLUTION INTRAVENOUS at 11:12

## 2023-12-20 NOTE — ANESTHESIA POSTPROCEDURE EVALUATION
Anesthesia Post Evaluation    Patient: Von Griffin Jr.    Procedure(s) Performed: Procedure(s) (LRB):  COLONOSCOPY (N/A)    Final Anesthesia Type: general      Patient location during evaluation: PACU  Patient participation: Yes- Able to Participate  Level of consciousness: awake and alert  Post-procedure vital signs: reviewed and stable  Pain management: adequate  Airway patency: patent    PONV status at discharge: No PONV  Anesthetic complications: no      Cardiovascular status: blood pressure returned to baseline  Respiratory status: unassisted  Hydration status: euvolemic  Follow-up not needed.              Vitals Value Taken Time   /98 12/20/23 1255   Temp 36.6 °C (97.9 °F) 12/20/23 1255   Pulse 76 12/20/23 1255   Resp 16 12/20/23 1255   SpO2 97 % 12/20/23 1255         Event Time   Out of Recovery 13:03:17         Pain/Ronal Score: Ronal Score: 10 (12/20/2023 12:55 PM)

## 2023-12-20 NOTE — ANESTHESIA PREPROCEDURE EVALUATION
12/20/2023  Von Griffin Jr. is a 68 y.o., male.      Pre-op Assessment    I have reviewed the Patient Summary Reports.     I have reviewed the Nursing Notes. I have reviewed the NPO Status.   I have reviewed the Medications.     Review of Systems  Anesthesia Hx:  No problems with previous Anesthesia                Cardiovascular:  Cardiovascular Normal                                            Pulmonary:  Pulmonary Normal                       Neurological:  Neurology Normal   Denies Neuromuscular Disease.                                 Neuromuscular Disease       Physical Exam  General: Well nourished    Airway:  Mallampati: II   Mouth Opening: Normal  TM Distance: Normal  Neck ROM: Normal ROM        Anesthesia Plan  Type of Anesthesia, risks & benefits discussed:    Anesthesia Type: Gen Natural Airway  Intra-op Monitoring Plan: Standard ASA Monitors  Induction:  IV  Informed Consent: Informed consent signed with the Patient and all parties understand the risks and agree with anesthesia plan.  All questions answered.   ASA Score: 2    Ready For Surgery From Anesthesia Perspective.     .

## 2023-12-20 NOTE — [employer] PATIENT INSTRUCTIONS
Discharge Summary/Instructions after an Endoscopic Procedure  Patient Name: Von Griffin  Patient MRN: [id number]  Patient YOB: 1955 Wednesday, December 20, 2023  William Price MD  Dear patient,  As a result of recent federal legislation (The Federal Cures Act), you may   receive lab or pathology results from your procedure in your [facility]   account before your physician is able to contact you. Your physician or   their representative will relay the results to you with their   recommendations at their soonest availability.  Thank you,  RESTRICTIONS:  During your procedure today, you received medications for sedation.  These   medications may affect your judgment, balance and coordination.  Therefore,   for 24 hours, you have the following restrictions:   - DO NOT drive a car, operate machinery, make legal/financial decisions,   sign important papers or drink alcohol.    ACTIVITY:  Today: no heavy lifting, straining or running due to procedural   sedation/anesthesia.  The following day: return to full activity including work.  DIET:  Eat and drink normally unless instructed otherwise.     TREATMENT FOR COMMON SIDE EFFECTS:  - Mild abdominal pain, nausea, belching, bloating or excessive gas:  rest,   eat lightly and use a heating pad.  - Sore Throat: treat with throat lozenges and/or gargle with warm salt   water.  - Because air was used during the procedure, expelling large amounts of air   from your rectum or belching is normal.  - If a bowel prep was taken, you may not have a bowel movement for 1-3 days.    This is normal.  SYMPTOMS TO WATCH FOR AND REPORT TO YOUR PHYSICIAN:  1. Abdominal pain or bloating, other than gas cramps.  2. Chest pain.  3. Back pain.  4. Signs of infection such as: chills or fever occurring within 24 hours   after the procedure.  5. Rectal bleeding, which would show as bright red, maroon, or black stools.   (A tablespoon of blood from the rectum is not serious, especially  if   hemorrhoids are present.)  6. Vomiting.  7. Weakness or dizziness.  GO DIRECTLY TO THE NEAREST EMERGENCY ROOM IF YOU HAVE ANY OF THE FOLLOWING:      Difficulty breathing              Chills and/or fever over 101 F   Persistent vomiting and/or vomiting blood   Severe abdominal pain   Severe chest pain   Black, tarry stools   Bleeding- more than one tablespoon   Any other symptom or condition that you feel may need urgent attention  Your doctor recommends these additional instructions:  If any biopsies were taken, your doctors clinic will contact you in 1 to 2   weeks with any results.  - Patient has a contact number available for emergencies.  The signs and   symptoms of potential delayed complications were discussed with the   patient.  Return to normal activities tomorrow.  Written discharge   instructions were provided to the patient.   - High fiber diet.   - Continue present medications.   - No aspirin, ibuprofen, naproxen, or other non-steroidal anti-inflammatory   drugs for 2 weeks after polyp removal.   - Await pathology results.   - Repeat colonoscopy in 3 years for surveillance.   - Discharge patient to home (ambulatory).   - Return to GI office PRN.  For questions, problems or results please call your physician - William Price MD at Work:  [phone].  [facility], EMERGENCY ROOM PHONE NUMBER: [phone]  IF A COMPLICATION OR EMERGENCY SITUATION ARISES AND YOU ARE UNABLE TO REACH   YOUR PHYSICIAN - GO DIRECTLY TO THE EMERGENCY ROOM.  William Price MD  12/20/2023 12:35:09 PM  This report has been verified and signed electronically.  Dear patient,  As a result of recent federal legislation (The Federal Cures Act), you may   receive lab or pathology results from your procedure in your [facility]   account before your physician is able to contact you. Your physician or   their representative will relay the results to you with their   recommendations at their soonest availability.  Thank  you,  PROVATION

## 2023-12-20 NOTE — H&P
CC: History of colon polyp    68 year old male with above. States that symptoms are absent, no alleviating/exacerbating factors. No family history of colorectal CA. Positive personal history of polyps. No bleeding or weight loss.     ROS:  No headache, no fever/chills, no chest pain/SOB, no nausea/vomiting/diarrhea/constipation/GI bleeding/abdominal pain, no dysuria/hematuria.    VSSAF   Exam:   Alert and oriented x 3; no apparent distress   PERRLA, sclera anicteric  CV: Regular rate/rhythm, normal PMI   Lungs: Clear bilaterally with no wheeze/rales   Abdomen: Soft, NT/ND, normal bowel sounds   Ext: No cyanosis, clubbing     Impression:   As above    Plan:   Proceed with endoscopy. Further recs to follow.

## 2023-12-20 NOTE — TRANSFER OF CARE
"Anesthesia Transfer of Care Note    Patient: Von Griffin Jr.    Procedure(s) Performed: Procedure(s) (LRB):  COLONOSCOPY (N/A)    Patient location: PACU    Anesthesia Type: general    Transport from OR: Transported from OR on 2-3 L/min O2 by NC with adequate spontaneous ventilation    Post pain: adequate analgesia    Post assessment: no apparent anesthetic complications and tolerated procedure well    Post vital signs: stable    Level of consciousness: awake and responds to stimulation    Nausea/Vomiting: no nausea/vomiting    Complications: none    Transfer of care protocol was followed      Last vitals: Visit Vitals  BP (!) 167/94   Pulse 87   Temp 36.6 °C (97.9 °F)   Resp 16   Ht 5' 7" (1.702 m)   Wt 92.5 kg (204 lb)   SpO2 96%   BMI 31.95 kg/m²     "

## 2023-12-20 NOTE — PLAN OF CARE
Vss, diaz po fluids, denies pain, ambulates easily. IV removed, catheter intact. Discharge instructions provided and states understanding. States ready to go home.  Discharged from facility with family per wheelchair.

## 2023-12-21 NOTE — PROGRESS NOTES
Please advise patient that polyp pathology was reviewed and is benign and is the adenomatous/serrated type which is precancerous/risk factor for cancer.  Repeat colonoscopy recommended in 3 years.  Place reminder in system for repeat colonoscopy.

## 2024-11-04 ENCOUNTER — PATIENT MESSAGE (OUTPATIENT)
Dept: RESEARCH | Facility: HOSPITAL | Age: 69
End: 2024-11-04
Payer: MEDICARE

## 2025-02-26 DIAGNOSIS — M48.061 LUMBAR SPINAL STENOSIS: ICD-10-CM

## 2025-02-26 DIAGNOSIS — M54.12 RADICULOPATHY, CERVICAL REGION: Primary | ICD-10-CM

## 2025-02-28 ENCOUNTER — HOSPITAL ENCOUNTER (OUTPATIENT)
Dept: RADIOLOGY | Facility: HOSPITAL | Age: 70
Discharge: HOME | End: 2025-02-28
Attending: NEUROLOGICAL SURGERY
Payer: MEDICARE

## 2025-02-28 DIAGNOSIS — M54.12 RADICULOPATHY, CERVICAL REGION: ICD-10-CM

## 2025-02-28 DIAGNOSIS — M48.061 LUMBAR SPINAL STENOSIS: ICD-10-CM

## 2025-02-28 PROCEDURE — 72050 X-RAY EXAM NECK SPINE 4/5VWS: CPT | Mod: 26,,, | Performed by: RADIOLOGY

## 2025-02-28 PROCEDURE — 72141 MRI NECK SPINE W/O DYE: CPT | Mod: 26,,, | Performed by: RADIOLOGY

## 2025-02-28 PROCEDURE — 72148 MRI LUMBAR SPINE W/O DYE: CPT | Mod: 26,,, | Performed by: RADIOLOGY

## 2025-02-28 PROCEDURE — 72050 X-RAY EXAM NECK SPINE 4/5VWS: CPT | Mod: TC,PO

## 2025-02-28 PROCEDURE — 72148 MRI LUMBAR SPINE W/O DYE: CPT | Mod: TC,PO

## 2025-02-28 PROCEDURE — 72141 MRI NECK SPINE W/O DYE: CPT | Mod: TC,PO

## 2025-04-23 DIAGNOSIS — M54.12 CERVICAL RADICULOPATHY: Primary | ICD-10-CM

## 2025-04-25 DIAGNOSIS — M50.222 HERNIATED NUCLEUS PULPOSUS, C5-6: Primary | ICD-10-CM

## 2025-04-28 ENCOUNTER — CLINICAL SUPPORT (OUTPATIENT)
Dept: REHABILITATION | Facility: HOSPITAL | Age: 70
End: 2025-04-28
Payer: MEDICARE

## 2025-04-28 DIAGNOSIS — R29.898 IMPAIRED FLEXIBILITY OF LOWER EXTREMITY: ICD-10-CM

## 2025-04-28 DIAGNOSIS — R29.898 DECREASED STRENGTH OF UPPER EXTREMITY: ICD-10-CM

## 2025-04-28 DIAGNOSIS — M50.222 HERNIATED NUCLEUS PULPOSUS, C5-6: Primary | ICD-10-CM

## 2025-04-28 DIAGNOSIS — M53.86 DECREASED ROM OF LUMBAR SPINE: ICD-10-CM

## 2025-04-28 DIAGNOSIS — M25.612 DECREASED ROM OF LEFT SHOULDER: ICD-10-CM

## 2025-04-28 PROCEDURE — 97161 PT EVAL LOW COMPLEX 20 MIN: CPT | Mod: PN | Performed by: PHYSICAL THERAPIST

## 2025-04-28 PROCEDURE — 97530 THERAPEUTIC ACTIVITIES: CPT | Mod: PN | Performed by: PHYSICAL THERAPIST

## 2025-04-28 NOTE — PROGRESS NOTES
Outpatient Rehab    Physical Therapy Evaluation    Patient Name: Von Griffin Jr.  MRN: [id number]  YOB: 1955  Encounter Date: 4/28/2025    Therapy Diagnosis:   Encounter Diagnoses   Name Primary?    Herniated nucleus pulposus, C5-6 Yes    Decreased ROM of left shoulder     Decreased ROM of lumbar spine     Impaired flexibility of lower extremity     Decreased strength of upper extremity      Physician: Matilde Acevedo MD    Physician Orders: Eval and Treat  Medical Diagnosis: Cervical radiculopathy  Herniated nucleus pulposus, C5-6    Visit # / Visits Authorized:  1 / 1  Insurance Authorization Period: 4/23/2025 to 4/23/2026  Date of Evaluation: 4/28/2025  Plan of Care Certification: 4/28/2025 to  7/11/2025    Time In: 1400   Time Out: 1500  Total Time: 60   Total Billable Time: 60    Intake Outcome Measure for FOTO Survey    Therapist reviewed FOTO scores for Von Griffin Jr. on 4/28/2025.   FOTO report - see Media section or FOTO account episode details.     Intake Score: 59%         Subjective   History of Present Illness  Von is a 69 y.o. male who reports to physical therapy with a chief concern of Left shoulder pain, lumbar and left hip pain.     The patient reports a medical diagnosis of Herniated nucleus pulposus, C5-6. The patient has experienced this issue since 04/23/25.   Diagnostic tests related to this condition: MRI studies.   MRI Studies Details: LUMBAR: FINDINGS:  There is normal curvature alignment of the lumbar spine.  Vertebral body heights are maintained.  There is diffuse disc desiccation throughout the lumbar spine with intervertebral disc height loss at T12-L1, L1-2, L4-5 and L5-S1.  Multilevel Schmorl's nodes.  There are multilevel vertebral body osteophytes.  There is no abnormal bone marrow signal.  Conus medullaris terminates at the L1-2 disc level.  There are no intrathecal abnormalities.  The paravertebral soft tissues are unremarkable.  The visualized abdominal  viscera are unremarkable.     T11-12 and T12-L1: Broad-based disc bulges indent the thecal sac.     L1-2: Broad-based disc bulge indents the ventral thecal sac.  AP diameter of the thecal sac measures 9 mm.  There is mild bilateral facet joint arthropathy.  Mild bilateral neural foraminal narrowing secondary to broad-based disc bulge.     L2-3: Mild bilateral facet joint arthropathy.     L3-4: Mild bilateral facet joint arthropathy.     L4-5: Broad-based disc bulge and severe facet joint arthropathy ligamentum flavum thickening spinal canal narrowing.  AP diameter of the thecal sac measures 8 mm and there is crowding of the nerve roots.  Small bilateral facet joint effusions.  There is severe bilateral neural foraminal narrowing with facet joint arthropathy touching the bilateral L4 nerve roots in the neural foramina.     L5-S1: Broad-based disc bulge without significant spinal canal narrowing.  Severe bilateral facet joint arthropathy with small facet joint effusions.  There is severe bilateral neural foraminal narrowing with facet joint arthropathy touching and possibly impinging the bilateral L5 nerve roots in the neural foramina. CERVICAL: Impression:     1. Multilevel cervical degenerative disc/facet disease.  2. Of particular note, there is severe spinal stenosis at the C5-6 level with cord compression, slightly increased, as well as severe spinal stenosis at C6-7, stable.  3. Severe multilevel bilateral foraminal stenosis as result of degenerative facet and uncinate hypertrophy, unchanged.  4. On axial T2 weighted images, small ill-defined foci of increased signal within the substance of the cervical spinal cord are unchanged, likely reflecting areas of myelomalacia.    Dominant Hand: Right  History of Present Condition/Illness: The patient presents with complaints of left shoulder pain and central lumbar pain. He reports decreased left shoulder ROM and pain with reaching overhead, behind his back and across  his body. He notes lumbar pain with forward flexion. He noted pain with prolonged standing and walking. 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    Pain     Patient reports a current pain level of 0/10. Pain at best is reported as 0/10. Pain at worst is reported as 7/10.   Location: Left shoulder, left posterior hip and LE  Clinical Progression (since onset): Stable  Pain Qualities: Other (Comment)  Other Pain Qualities: Tightness  Pain-Relieving Factors: Rest  Pain-Aggravating Factors: Bending         Living Arrangements  Living Situation  Housing: Home independently  Living Arrangements: Spouse/significant other, Family members  Support Systems: Spouse/significant other, Family members    Home Setup  Type of Structure: House  Number of Levels in Home: One level        Employment  Employment Status: Employed full-time   Professional       Past Medical History/Physical Systems Review:   Von Griffin Jr.  has a past medical history of CTS (carpal tunnel syndrome).    Von Griffin Jr.  has a past surgical history that includes Tonsillectomy; Eye surgery; Carpal tunnel release; Colonoscopy (N/A, 12/1/2015); Carpal tunnel release (Left, 12/1/2021); Ulnar tunnel release (Left, 12/1/2021); and Colonoscopy (N/A, 12/20/2023).    Von has a current medication list which includes the following prescription(s): anastrozole, chlorhexidine, ibuprofen, levocetirizine, omeprazole, and testosterone cypionate.    Review of patient's allergies indicates:  No Known Allergies     Objective   Posture  Patient presents with a Forward head position. Flat lumbar spine and Increased thoracic kyphosis is observed.   Shoulders are Rounded. Bilateral scapulae are: Protracted              Spinal Muscle Palpation          Left Spinal Muscle Palpation  Abnormal: Lumbar/Sacral     Increase muscle tone of the left pirifomis    Hip Palpation          Left Hip  Palpation  Abnormal: Lumbar/Sacral Muscle               Lumbar Range of Motion   Active (deg) Passive (deg) Pain   Flexion 22   Yes   Extension 10       Right Lateral Flexion 10       Right Rotation         Left Lateral Flexion 12       Left Rotation                  Shoulder Range of Motion  Right Shoulder   Active (deg) Passive (deg) Pain   Flexion 154       Extension         Scaption         ABduction 143       ADduction         Horizontal ABduction         Horizontal ADduction         External Rotation (Shoulder ABducted 0 degrees)         External Rotation (Shoulder ABducted 45 degrees)         External Rotation (Shoulder ABducted 90 degrees) 62       Internal Rotation (Shoulder ABducted 0 degrees)         Internal Rotation (Shoulder ABducted 45 degrees)         Internal Rotation (Shoulder ABducted 90 degrees)           Left Shoulder   Active (deg) Passive (deg) Pain   Flexion 124       Extension         Scaption         ABduction 128       ADduction         Horizontal ABduction         Horizontal ADduction         External Rotation (Shoulder ABducted 0 degrees)         External Rotation (Shoulder ABducted 45 degrees)         External Rotation (Shoulder ABducted 90 degrees) 58       Internal Rotation (Shoulder ABducted 0 degrees)         Internal Rotation (Shoulder ABducted 45 degrees)         Internal Rotation (Shoulder ABducted 90 degrees)                       Shoulder Strength - Planes of Motion   Right Strength Right Pain Left Strength Left  Pain   Flexion 5   5     Extension 5   5     ABduction 5   5     ADduction 5   5     Horizontal ABduction 5   5     Horizontal ADduction 5   5     Internal Rotation 0° 5   5     Internal Rotation 90°           External Rotation 0° 4   4     External Rotation 90°                         Hip Special Tests  90/90 Hamstring Test  Positive: Right and Left  Right 90/90 Hamstring Test Hip Flexion: 90  Left 90/90 Hamstring Test Hip Flexion: 90  Right 90/90 Hamstring Test Knee  Extension: 42  Left 90/90 Hamstring Test Knee Extension: 36       Knee Special Tests                 Treatment:     Seated Hamstring stretch 3 x 30 sec B  Seated piriformis stretch 3 x 30 sec B  Seated Gastroc-soleus stretch 3 x 30 sec B  Seated lumbar flexion with Physioball 3 x 10  Cable column Shoulder extension with 17#  3 x 10  Cable column Shoulder adduction with 17# 3 x 10  Cable column Scapular depression with 17# 3 x 10  Cable column Mid rows with 17# 3 x 10    Future    Seated Physioball upper extremity/lower extremity lumbar stabilization  Dry needling to left piriformis  Serratus flexion on the wall with 4 inch bolster 3 x 10      Time Entry(in minutes):  PT Evaluation (Low) Time Entry: 30  Therapeutic Activity Time Entry: 30    Assessment & Plan   Assessment  Von presents with a condition of Low complexity.   Presentation of Symptoms: Stable       Functional Limitations: Activity tolerance, Functional mobility  Impairments: Activity intolerance, Impaired physical strength, Pain with functional activity  Personal Factors Affecting Prognosis: Physical limitations, Pain    Patient Goal for Therapy (PT): Return to previous level of activity  Prognosis: Good  Assessment Details: The patient is a 69 year old male with complaints of left shoulder and lumbar/left lower extremity pain. He presents with: decreased left shoulder ROM, decrease lower extremity flexibility,decreased periscapular strength & pain with functional activity. He will benefit from skilled PT to increased lower extremity mobility, increase periscapular strength & return to previous level of activity.     Plan  From a physical therapy perspective, the patient would benefit from: Skilled Rehab Services    Planned therapy interventions include: Neuromuscular re-education, Therapeutic activities, Therapeutic exercise, Manual therapy, and Other (Comment). Dry needling          Visit Frequency: 1 times Per Week for 8 Weeks.       This plan was  discussed with Patient.   Discussion participants: Agreed Upon Plan of Care             Patient's spiritual, cultural, and educational needs considered and patient agreeable to plan of care and goals.     Education  Education was done with Patient. The patient's learning style includes Demonstration, Listening, and Pictures/video. The patient Demonstrates understanding and Verbalizes understanding.                 Goals:   Active       A. STG       The patient will begin a written home exercise program        Start:  04/28/25    Expected End:  05/26/25            Decrease pain at worst to 4/10        Start:  04/28/25    Expected End:  05/26/25            Increase left shoulder strength to 4+/5 flexion, 4+/5 abduction, 4+/5 external rotation         Start:  04/28/25    Expected End:  05/26/25            Increase hamstring flexibility to 60 degrees        Start:  04/28/25    Expected End:  05/26/25               B. LTG       The patient will be independent with a home exercise program        Start:  04/28/25    Expected End:  07/11/25            Decrease pain at worst to 2/10        Start:  04/28/25    Expected End:  07/11/25            Increase left shoulder strength to 5/5       Start:  04/28/25    Expected End:  07/11/25            Increase hamstring flexibility to 70 degrees        Start:  04/28/25    Expected End:  07/11/25            Increase the patients FOTO score to 70        Start:  04/28/25    Expected End:  07/11/25                Wayne Ambrose, MS, PT, ATC

## 2025-05-06 ENCOUNTER — CLINICAL SUPPORT (OUTPATIENT)
Dept: REHABILITATION | Facility: HOSPITAL | Age: 70
End: 2025-05-06
Payer: MEDICARE

## 2025-05-06 DIAGNOSIS — M50.222 HERNIATED NUCLEUS PULPOSUS, C5-6: Primary | ICD-10-CM

## 2025-05-06 DIAGNOSIS — M25.612 DECREASED ROM OF LEFT SHOULDER: ICD-10-CM

## 2025-05-06 DIAGNOSIS — R29.898 IMPAIRED FLEXIBILITY OF LOWER EXTREMITY: ICD-10-CM

## 2025-05-06 DIAGNOSIS — M53.86 DECREASED ROM OF LUMBAR SPINE: ICD-10-CM

## 2025-05-06 DIAGNOSIS — R29.898 DECREASED STRENGTH OF UPPER EXTREMITY: ICD-10-CM

## 2025-05-06 PROCEDURE — 97140 MANUAL THERAPY 1/> REGIONS: CPT | Mod: PN | Performed by: PHYSICAL THERAPIST

## 2025-05-06 PROCEDURE — 97014 ELECTRIC STIMULATION THERAPY: CPT | Mod: PN | Performed by: PHYSICAL THERAPIST

## 2025-05-06 NOTE — PROGRESS NOTES
Outpatient Rehab    Physical Therapy Visit    Patient Name: Von Griffin Jr.  MRN: [id number]  YOB: 1955  Encounter Date: 5/6/2025    Therapy Diagnosis:   Encounter Diagnoses   Name Primary?    Herniated nucleus pulposus, C5-6 Yes    Decreased ROM of left shoulder     Decreased ROM of lumbar spine     Impaired flexibility of lower extremity     Decreased strength of upper extremity      Physician: Matilde Acevedo MD    Physician Orders: Eval and Treat  Medical Diagnosis: Cervical radiculopathy    Visit # / Visits Authorized:  1 / 12  Insurance Authorization Period: 5/6/2025 to 7/11/2025  Date of Evaluation: 4/20/2025  Plan of Care Certification: 4/28/2025 to 7/11/2025     PT/PTA:     Number of PTA visits since last PT visit:   Time In: 1400   Time Out: 1500  Total Time (in minutes): 60   Total Billable Time (in minutes): 45    FOTO:  Intake Score:  %  Survey Score 2:  %  Survey Score 3:  %         Subjective   The patient reports left shoulder and bilateral LE tightness.  Pain reported as 2/10.      Objective            Treatment:     MANUAL THERAPY TECHNIQUES including Joint mobilizations, Soft tissue Mobilization, and mobilizations with movement were applied to left shoulder and B lower extremities for 18 minutes.    Inferior GH glides grade III/IV  Posterior GH glides grade III/IV    MWM distracted straight raise x 10 B  Left upper trapezius stretch 3 x 30 sec      Dry Needling Performed by Wayne Ambrose PT Cert. DN: The patient was cleared of all contraindications and educated on the risks and effects of dry needling, and post needling expectations. The patient was agreeable to dry needling treatment. Pt signed consent form pre needling. Dry Needling was performed using 75mm x 0.30mm needles to bilateral piriformis with electrical stimulation applied at 2 Hz for 12 minutes and 60mm x 0.30mm needles to L5 paraspinals with no IMS. No adverse affects were noted. Patient related some soreness post  Rx.         Time Entry(in minutes):  E-Stim (Unattended) Time Entry: 15  Manual Therapy Time Entry: 30    Assessment & Plan   Assessment: Patient tolerated treatment well without report of adverse effects. He had a good response to dry needling  Evaluation/Treatment Tolerance: Patient tolerated treatment well    Patient will continue to benefit from skilled outpatient physical therapy to address the deficits listed in the problem list box on initial evaluation, provide pt/family education and to maximize pt's level of independence in the home and community environment.     Patient's spiritual, cultural, and educational needs considered and patient agreeable to plan of care and goals.           Plan: Continue with PT as per plan of care    Goals:   Active       A. STG       The patient will begin a written home exercise program  (Progressing)       Start:  04/28/25    Expected End:  05/26/25            Decrease pain at worst to 4/10  (Progressing)       Start:  04/28/25    Expected End:  05/26/25            Increase left shoulder strength to 4+/5 flexion, 4+/5 abduction, 4+/5 external rotation   (Progressing)       Start:  04/28/25    Expected End:  05/26/25            Increase hamstring flexibility to 60 degrees  (Progressing)       Start:  04/28/25    Expected End:  05/26/25               B. LTG       The patient will be independent with a home exercise program  (Progressing)       Start:  04/28/25    Expected End:  07/11/25            Decrease pain at worst to 2/10  (Progressing)       Start:  04/28/25    Expected End:  07/11/25            Increase left shoulder strength to 5/5 (Progressing)       Start:  04/28/25    Expected End:  07/11/25            Increase hamstring flexibility to 70 degrees  (Progressing)       Start:  04/28/25    Expected End:  07/11/25            Increase the patients FOTO score to 70  (Progressing)       Start:  04/28/25    Expected End:  07/11/25                Wayne Ambrose, MS, PT,  ATC

## 2025-05-23 ENCOUNTER — CLINICAL SUPPORT (OUTPATIENT)
Dept: REHABILITATION | Facility: HOSPITAL | Age: 70
End: 2025-05-23
Payer: MEDICARE

## 2025-05-23 DIAGNOSIS — R29.898 IMPAIRED FLEXIBILITY OF LOWER EXTREMITY: ICD-10-CM

## 2025-05-23 DIAGNOSIS — M53.86 DECREASED ROM OF LUMBAR SPINE: ICD-10-CM

## 2025-05-23 DIAGNOSIS — M50.222 HERNIATED NUCLEUS PULPOSUS, C5-6: Primary | ICD-10-CM

## 2025-05-23 DIAGNOSIS — R29.898 DECREASED STRENGTH OF UPPER EXTREMITY: ICD-10-CM

## 2025-05-23 DIAGNOSIS — M25.612 DECREASED ROM OF LEFT SHOULDER: ICD-10-CM

## 2025-05-23 PROCEDURE — 97140 MANUAL THERAPY 1/> REGIONS: CPT | Mod: PN | Performed by: PHYSICAL THERAPIST

## 2025-05-23 PROCEDURE — 97014 ELECTRIC STIMULATION THERAPY: CPT | Mod: PN | Performed by: PHYSICAL THERAPIST

## 2025-05-23 NOTE — PROGRESS NOTES
Outpatient Rehab    Physical Therapy Visit    Patient Name: Von Griffin Jr.  MRN: [id number]  YOB: 1955  Encounter Date: 5/23/2025    Therapy Diagnosis:   Encounter Diagnoses   Name Primary?    Herniated nucleus pulposus, C5-6 Yes    Decreased ROM of left shoulder     Decreased ROM of lumbar spine     Impaired flexibility of lower extremity     Decreased strength of upper extremity        Physician: Matilde Acevedo MD    Physician Orders: Eval and Treat  Medical Diagnosis: Cervical radiculopathy    Visit # / Visits Authorized:  2 / 12  Insurance Authorization Period: 5/6/2025 to 7/11/2025  Date of Evaluation: 4/20/2025  Plan of Care Certification: 4/28/2025 to 7/11/2025     PT/PTA: PT   Number of PTA visits since last PT visit:0  Time In: 1100   Time Out: 1145  Total Time (in minutes): 45   Total Billable Time (in minutes): 30    FOTO:  Intake Score:  %  Survey Score 2:  %  Survey Score 3:  %         Subjective   The patient reports his back pain has improved. He note left shoulder tightness.  Pain reported as 2/10.      Objective            Treatment:     MANUAL THERAPY TECHNIQUES including Joint mobilizations, Soft tissue Mobilization, and mobilizations with movement were applied to left shoulder and B lower extremities for 18 minutes.    Inferior GH glides grade III/IV  Posterior GH glides grade III/IV    MWM distracted straight raise x 10 B  Low load long duration stretch external rotation stretch 4 min      Dry Needling Performed by Wayne Ambrose PT Cert. DN: The patient was cleared of all contraindications and educated on the risks and effects of dry needling, and post needling expectations. The patient was agreeable to dry needling treatment. Pt signed consent form pre needling. Dry Needling was performed using 75mm x 0.30mm needles to left anterior and posterior deltoid with electrical stimulation applied at 2 Hz for 12 minutes and 60mm x 0.30mm needles to L5 paraspinals with no IMS. No  adverse affects were noted. Patient related some soreness post Rx.         Time Entry(in minutes):  Manual Therapy Time Entry: 30    Assessment & Plan   Assessment: Patient displayed improved left shoulder ER following manual therapy and dry needliing  Evaluation/Treatment Tolerance: Patient tolerated treatment well    Patient will continue to benefit from skilled outpatient physical therapy to address the deficits listed in the problem list box on initial evaluation, provide pt/family education and to maximize pt's level of independence in the home and community environment.     Patient's spiritual, cultural, and educational needs considered and patient agreeable to plan of care and goals.           Plan: Continue with PT as per plan of care    Goals:   Active       A. STG       The patient will begin a written home exercise program  (Progressing)       Start:  04/28/25    Expected End:  05/26/25            Decrease pain at worst to 4/10  (Progressing)       Start:  04/28/25    Expected End:  05/26/25            Increase left shoulder strength to 4+/5 flexion, 4+/5 abduction, 4+/5 external rotation   (Progressing)       Start:  04/28/25    Expected End:  05/26/25            Increase hamstring flexibility to 60 degrees  (Progressing)       Start:  04/28/25    Expected End:  05/26/25               B. LTG       The patient will be independent with a home exercise program  (Progressing)       Start:  04/28/25    Expected End:  07/11/25            Decrease pain at worst to 2/10  (Progressing)       Start:  04/28/25    Expected End:  07/11/25            Increase left shoulder strength to 5/5 (Progressing)       Start:  04/28/25    Expected End:  07/11/25            Increase hamstring flexibility to 70 degrees  (Progressing)       Start:  04/28/25    Expected End:  07/11/25            Increase the patients FOTO score to 70  (Progressing)       Start:  04/28/25    Expected End:  07/11/25                  Wayne Ambrose,  MS, PT, ATC

## 2025-05-29 ENCOUNTER — PATIENT MESSAGE (OUTPATIENT)
Dept: REHABILITATION | Facility: HOSPITAL | Age: 70
End: 2025-05-29
Payer: MEDICARE

## 2025-05-30 ENCOUNTER — CLINICAL SUPPORT (OUTPATIENT)
Dept: REHABILITATION | Facility: HOSPITAL | Age: 70
End: 2025-05-30
Payer: MEDICARE

## 2025-05-30 DIAGNOSIS — M50.222 HERNIATED NUCLEUS PULPOSUS, C5-6: Primary | ICD-10-CM

## 2025-05-30 DIAGNOSIS — M25.612 DECREASED ROM OF LEFT SHOULDER: ICD-10-CM

## 2025-05-30 DIAGNOSIS — M53.86 DECREASED ROM OF LUMBAR SPINE: ICD-10-CM

## 2025-05-30 DIAGNOSIS — R29.898 IMPAIRED FLEXIBILITY OF LOWER EXTREMITY: ICD-10-CM

## 2025-05-30 DIAGNOSIS — R29.898 DECREASED STRENGTH OF UPPER EXTREMITY: ICD-10-CM

## 2025-05-30 PROCEDURE — 97014 ELECTRIC STIMULATION THERAPY: CPT | Mod: PN | Performed by: PHYSICAL THERAPIST

## 2025-05-30 PROCEDURE — 97140 MANUAL THERAPY 1/> REGIONS: CPT | Mod: PN | Performed by: PHYSICAL THERAPIST

## 2025-05-30 NOTE — PROGRESS NOTES
Outpatient Rehab    Physical Therapy Visit    Patient Name: Von Griffin Jr.  MRN: [id number]  YOB: 1955  Encounter Date: 5/30/2025    Therapy Diagnosis:   Encounter Diagnoses   Name Primary?    Herniated nucleus pulposus, C5-6 Yes    Decreased ROM of left shoulder     Decreased ROM of lumbar spine     Impaired flexibility of lower extremity     Decreased strength of upper extremity      Physician: Matilde Acevedo MD    Physician Orders: Eval and Treat  Medical Diagnosis: Cervical radiculopathy    Visit # / Visits Authorized:  3 / 12  Insurance Authorization Period: 5/6/2025 to 7/11/2025  Date of Evaluation: 4/28/2025  Plan of Care Certification: 4/28/2025 to 7/11/2025      PT/PTA: PT   Number of PTA visits since last PT visit:0  Time In: 1105   Time Out: 1140  Total Time (in minutes): 35   Total Billable Time (in minutes): 30    FOTO:  Intake Score:  %  Survey Score 2:  %  Survey Score 3:  %    Precautions:       Subjective   The patient reports his shoulder did better after dry needling last visit.  Pain reported as 2/10.      Objective            Treatment:     Dry Needling Performed by Wayne Ambrose, PT Cert. DN: The patient was cleared of all contraindications and educated on the risks and effects of dry needling, and post needling expectations. The patient was agreeable to dry needling treatment. Pt signed consent form pre needling. Dry Needling was performed using 60 mm x 0.30 mm needles to bilateral lumbar paraspinals (L2-L5) with electrical stimulation applied at 2 Hz for 15 minutes. No adverse affects were noted. Patient related some soreness post Rx.         Time Entry(in minutes):  E-Stim (Unattended) Time Entry: 15  Manual Therapy Time Entry: 15    Assessment & Plan   Assessment:         The patient will continue to benefit from skilled outpatient physical therapy in order to address the deficits listed in the problem list on the initial evaluation, provide patient and family education,  and maximize the patients level of independence in the home and community environments.     The patient's spiritual, cultural, and educational needs were considered, and the patient is agreeable to the plan of care and goals.           Plan: Continue with PT as per plan of care    Goals:   Active       A. STG       The patient will begin a written home exercise program  (Progressing)       Start:  04/28/25    Expected End:  05/26/25            Decrease pain at worst to 4/10  (Progressing)       Start:  04/28/25    Expected End:  05/26/25            Increase left shoulder strength to 4+/5 flexion, 4+/5 abduction, 4+/5 external rotation   (Progressing)       Start:  04/28/25    Expected End:  05/26/25            Increase hamstring flexibility to 60 degrees  (Progressing)       Start:  04/28/25    Expected End:  05/26/25               B. LTG       The patient will be independent with a home exercise program  (Progressing)       Start:  04/28/25    Expected End:  07/11/25            Decrease pain at worst to 2/10  (Progressing)       Start:  04/28/25    Expected End:  07/11/25            Increase left shoulder strength to 5/5 (Progressing)       Start:  04/28/25    Expected End:  07/11/25            Increase hamstring flexibility to 70 degrees  (Progressing)       Start:  04/28/25    Expected End:  07/11/25            Increase the patients FOTO score to 70  (Progressing)       Start:  04/28/25    Expected End:  07/11/25                Wayne Ambrose, MS, PT, ATC

## 2025-06-06 ENCOUNTER — CLINICAL SUPPORT (OUTPATIENT)
Dept: REHABILITATION | Facility: HOSPITAL | Age: 70
End: 2025-06-06
Payer: MEDICARE

## 2025-06-06 DIAGNOSIS — M50.222 HERNIATED NUCLEUS PULPOSUS, C5-6: Primary | ICD-10-CM

## 2025-06-06 DIAGNOSIS — R29.898 DECREASED STRENGTH OF UPPER EXTREMITY: ICD-10-CM

## 2025-06-06 DIAGNOSIS — M25.612 DECREASED ROM OF LEFT SHOULDER: ICD-10-CM

## 2025-06-06 DIAGNOSIS — M53.86 DECREASED ROM OF LUMBAR SPINE: ICD-10-CM

## 2025-06-06 DIAGNOSIS — R29.898 IMPAIRED FLEXIBILITY OF LOWER EXTREMITY: ICD-10-CM

## 2025-06-06 PROCEDURE — 97014 ELECTRIC STIMULATION THERAPY: CPT | Mod: PN | Performed by: PHYSICAL THERAPIST

## 2025-06-06 PROCEDURE — 97140 MANUAL THERAPY 1/> REGIONS: CPT | Mod: PN | Performed by: PHYSICAL THERAPIST

## 2025-06-13 ENCOUNTER — CLINICAL SUPPORT (OUTPATIENT)
Dept: REHABILITATION | Facility: HOSPITAL | Age: 70
End: 2025-06-13
Payer: MEDICARE

## 2025-06-13 DIAGNOSIS — M50.222 HERNIATED NUCLEUS PULPOSUS, C5-6: Primary | ICD-10-CM

## 2025-06-13 DIAGNOSIS — M25.612 DECREASED ROM OF LEFT SHOULDER: ICD-10-CM

## 2025-06-13 DIAGNOSIS — M53.86 DECREASED ROM OF LUMBAR SPINE: ICD-10-CM

## 2025-06-13 DIAGNOSIS — R29.898 IMPAIRED FLEXIBILITY OF LOWER EXTREMITY: ICD-10-CM

## 2025-06-13 DIAGNOSIS — R29.898 DECREASED STRENGTH OF UPPER EXTREMITY: ICD-10-CM

## 2025-06-13 PROCEDURE — 97140 MANUAL THERAPY 1/> REGIONS: CPT | Mod: PN | Performed by: PHYSICAL THERAPIST

## 2025-06-13 PROCEDURE — 97014 ELECTRIC STIMULATION THERAPY: CPT | Mod: PN | Performed by: PHYSICAL THERAPIST

## 2025-06-13 NOTE — PROGRESS NOTES
Outpatient Rehab    Physical Therapy Progress Note    Patient Name: Von Griffin Jr.  MRN: [id number]  YOB: 1955  Encounter Date: 6/13/2025    Therapy Diagnosis:   Encounter Diagnoses   Name Primary?    Herniated nucleus pulposus, C5-6 Yes    Decreased ROM of left shoulder     Decreased ROM of lumbar spine     Impaired flexibility of lower extremity     Decreased strength of upper extremity      Physician: Matilde Acevedo MD    Physician Orders: Eval and Treat  Medical Diagnosis: Cervical radiculopathy    Visit # / Visits Authorized:  5 / 12  Insurance Authorization Period: 5/6/2025 to 7/11/2025  Date of Evaluation: 4/28/2025  Plan of Care Certification: 4/28/2025 to 7/11/2025      PT/PTA: PT   Number of PTA visits since last PT visit:0  Time In: 1100   Time Out: 1130  Total Time (in minutes): 30   Total Billable Time (in minutes): 30    FOTO:  Intake Score:  %  Survey Score 2:  %  Survey Score 3:  %    Precautions:       Subjective   The patient reports left shoulder pain.  Pain reported as 6/10.      Objective            Treatment:     MANUAL THERAPY TECHNIQUES including Joint mobilizations and Dry Needling were applied to left posterior deltoid and wrist extensor group for 25 minutes.     Inferior GH glides grade III/IV  Posterior GH glides grade III/IV    Dry Needling Performed by LAURA Rosario. DN: The patient was cleared of all contraindications and educated on the risks and effects of dry needling, and post needling expectations. The patient was agreeable to dry needling treatment. Pt signed consent form pre needling. Dry Needling was performed using 60 mm x 0.30 mm needles to left posterior and posterior deltoid and biceps with electrical stimulation applied at 2 Hz for 15 minutes. No adverse affects were noted. Patient related some soreness post Rx.      Time Entry(in minutes):  E-Stim (Unattended) Time Entry: 15  Manual Therapy Time Entry: 15    Assessment & Plan   Assessment:  Patient tolerated treatment well without report of adverse effects.       The patient will continue to benefit from skilled outpatient physical therapy in order to address the deficits listed in the problem list on the initial evaluation, provide patient and family education, and maximize the patients level of independence in the home and community environments.     The patient's spiritual, cultural, and educational needs were considered, and the patient is agreeable to the plan of care and goals.           Plan: Continue with PT as per plan of care    Goals:   Active       A. STG       The patient will begin a written home exercise program  (Progressing)       Start:  04/28/25    Expected End:  05/26/25            Decrease pain at worst to 4/10  (Progressing)       Start:  04/28/25    Expected End:  05/26/25            Increase left shoulder strength to 4+/5 flexion, 4+/5 abduction, 4+/5 external rotation   (Progressing)       Start:  04/28/25    Expected End:  05/26/25            Increase hamstring flexibility to 60 degrees  (Progressing)       Start:  04/28/25    Expected End:  05/26/25               B. LTG       The patient will be independent with a home exercise program  (Progressing)       Start:  04/28/25    Expected End:  07/11/25            Decrease pain at worst to 2/10  (Progressing)       Start:  04/28/25    Expected End:  07/11/25            Increase left shoulder strength to 5/5 (Progressing)       Start:  04/28/25    Expected End:  07/11/25            Increase hamstring flexibility to 70 degrees  (Progressing)       Start:  04/28/25    Expected End:  07/11/25            Increase the patients FOTO score to 70  (Progressing)       Start:  04/28/25    Expected End:  07/11/25                Wayne Ambrose, MS, PT, ATC

## 2025-06-20 ENCOUNTER — CLINICAL SUPPORT (OUTPATIENT)
Dept: REHABILITATION | Facility: HOSPITAL | Age: 70
End: 2025-06-20
Payer: MEDICARE

## 2025-06-20 DIAGNOSIS — M25.612 DECREASED ROM OF LEFT SHOULDER: ICD-10-CM

## 2025-06-20 DIAGNOSIS — R29.898 IMPAIRED FLEXIBILITY OF LOWER EXTREMITY: ICD-10-CM

## 2025-06-20 DIAGNOSIS — M50.222 HERNIATED NUCLEUS PULPOSUS, C5-6: Primary | ICD-10-CM

## 2025-06-20 DIAGNOSIS — M53.86 DECREASED ROM OF LUMBAR SPINE: ICD-10-CM

## 2025-06-20 DIAGNOSIS — R29.898 DECREASED STRENGTH OF UPPER EXTREMITY: ICD-10-CM

## 2025-06-20 PROCEDURE — 97014 ELECTRIC STIMULATION THERAPY: CPT | Mod: PN | Performed by: PHYSICAL THERAPIST

## 2025-06-20 PROCEDURE — 97140 MANUAL THERAPY 1/> REGIONS: CPT | Mod: PN | Performed by: PHYSICAL THERAPIST

## 2025-06-20 NOTE — PROGRESS NOTES
Outpatient Rehab    Physical Therapy Progress Note    Patient Name: Von Griffin Jr.  MRN: [id number]  YOB: 1955  Encounter Date: 6/20/2025    Therapy Diagnosis:   Encounter Diagnoses   Name Primary?    Herniated nucleus pulposus, C5-6 Yes    Decreased ROM of left shoulder     Decreased ROM of lumbar spine     Impaired flexibility of lower extremity     Decreased strength of upper extremity      Physician: Matilde Acevedo MD    Physician Orders: Eval and Treat  Medical Diagnosis: Cervical radiculopathy    Visit # / Visits Authorized:  6 / 12  Insurance Authorization Period: 5/6/2025 to 7/11/2025  Date of Evaluation: 4/28/2025  Plan of Care Certification: 4/28/2025 to 7/11/2025      PT/PTA: PT   Number of PTA visits since last PT visit:0  Time In: 1100   Time Out: 1130  Total Time (in minutes): 30   Total Billable Time (in minutes): 30    FOTO:  Intake Score:  %  Survey Score 2:  %  Survey Score 3:  %    Precautions:       Subjective   The patient reports right shoulder pain and bilateral lumbar pain.  Pain reported as 6/10.      Objective            Treatment:     MANUAL THERAPY TECHNIQUES including Joint mobilizations and Dry Needling were applied to left posterior deltoid and wrist extensor group for 25 minutes.     Inferior GH glides grade III/IV  Posterior GH glides grade III/IV    Dry Needling Performed by Wayne Ambrose, LAURA Cert. DN: The patient was cleared of all contraindications and educated on the risks and effects of dry needling, and post needling expectations. The patient was agreeable to dry needling treatment. Pt signed consent form pre needling. Dry Needling was performed using 60 mm x 0.30 mm needles to left posterior and posterior deltoid and biceps with electrical stimulation applied at 2 Hz for 15 minutes. No adverse affects were noted. Patient related some soreness post Rx.      Time Entry(in minutes):  E-Stim (Unattended) Time Entry: 15  Manual Therapy Time Entry:  15    Assessment & Plan   Assessment: Patient tolerated treatment well without report of adverse effects.         The patient will continue to benefit from skilled outpatient physical therapy in order to address the deficits listed in the problem list on the initial evaluation, provide patient and family education, and maximize the patients level of independence in the home and community environments.     The patient's spiritual, cultural, and educational needs were considered, and the patient is agreeable to the plan of care and goals.           Plan: Continue with PT as per plan of care    Goals:   Active       A. STG       The patient will begin a written home exercise program  (Met)       Start:  04/28/25    Expected End:  05/26/25    Resolved:  06/20/25         Decrease pain at worst to 4/10  (Progressing)       Start:  04/28/25    Expected End:  07/04/25            Increase left shoulder strength to 4+/5 flexion, 4+/5 abduction, 4+/5 external rotation   (Progressing)       Start:  04/28/25    Expected End:  07/04/25            Increase hamstring flexibility to 60 degrees  (Progressing)       Start:  04/28/25    Expected End:  07/04/25               B. LTG       The patient will be independent with a home exercise program  (Progressing)       Start:  04/28/25    Expected End:  07/11/25            Decrease pain at worst to 2/10  (Progressing)       Start:  04/28/25    Expected End:  07/11/25            Increase left shoulder strength to 5/5 (Progressing)       Start:  04/28/25    Expected End:  07/11/25            Increase hamstring flexibility to 70 degrees  (Progressing)       Start:  04/28/25    Expected End:  07/11/25            Increase the patients FOTO score to 70  (Progressing)       Start:  04/28/25    Expected End:  07/11/25                Wayne Ambrose, MS, PT, ATC

## 2025-06-26 NOTE — PROGRESS NOTES
Outpatient Rehab    Physical Therapy Progress Note    Patient Name: Von Griffin Jr.  MRN: [id number]  YOB: 1955  Encounter Date: 6/27/2025    Therapy Diagnosis:   Encounter Diagnoses   Name Primary?    Herniated nucleus pulposus, C5-6 Yes    Decreased ROM of left shoulder     Decreased ROM of lumbar spine     Impaired flexibility of lower extremity     Decreased strength of upper extremity        Physician: Matilde Acevedo MD    Physician Orders: Eval and Treat  Medical Diagnosis: Cervical radiculopathy    Visit # / Visits Authorized:  7 / 12  Insurance Authorization Period: 5/6/2025 to 7/11/2025  Date of Evaluation: 4/28/2025  Plan of Care Certification: 4/28/2025 to 7/11/2025      PT/PTA: PT   Number of PTA visits since last PT visit:0  Time In: 1100   Time Out: 1130  Total Time (in minutes): 30   Total Billable Time (in minutes): 30    FOTO:  Intake Score:  %  Survey Score 2:  %  Survey Score 3:  %    Precautions:       Subjective   The patient reports continued right shoulder pain.  Pain reported as 5/10.      Objective            Treatment:     MANUAL THERAPY TECHNIQUES including Joint mobilizations and Dry Needling were applied to left posterior deltoid and wrist extensor group for 25 minutes.     Inferior GH glides grade III/IV  Posterior GH glides grade III/IV    Dry Needling Performed by Wayne Ambrose PT Cert. DN: The patient was cleared of all contraindications and educated on the risks and effects of dry needling, and post needling expectations. The patient was agreeable to dry needling treatment. Pt signed consent form pre needling. Dry Needling was performed using 60 mm x 0.30 mm needles to left anterior & posterior deltoid and biceps with electrical stimulation applied at 2 Hz for 15 minutes. No adverse affects were noted. Patient related some soreness post Rx.      Time Entry(in minutes):  E-Stim (Unattended) Time Entry: 15  Manual Therapy Time Entry: 15    Assessment & Plan    Assessment: Patient tolerated treatment well without report of adverse effects.         The patient will continue to benefit from skilled outpatient physical therapy in order to address the deficits listed in the problem list on the initial evaluation, provide patient and family education, and maximize the patients level of independence in the home and community environments.     The patient's spiritual, cultural, and educational needs were considered, and the patient is agreeable to the plan of care and goals.           Plan: Continue with PT as per plan of care    Goals:   Active       A. STG       The patient will begin a written home exercise program  (Met)       Start:  04/28/25    Expected End:  05/26/25    Resolved:  06/20/25         Decrease pain at worst to 4/10  (Progressing)       Start:  04/28/25    Expected End:  07/04/25            Increase left shoulder strength to 4+/5 flexion, 4+/5 abduction, 4+/5 external rotation   (Progressing)       Start:  04/28/25    Expected End:  07/04/25            Increase hamstring flexibility to 60 degrees  (Progressing)       Start:  04/28/25    Expected End:  07/04/25               B. LTG       The patient will be independent with a home exercise program  (Progressing)       Start:  04/28/25    Expected End:  07/11/25            Decrease pain at worst to 2/10  (Progressing)       Start:  04/28/25    Expected End:  07/11/25            Increase left shoulder strength to 5/5 (Progressing)       Start:  04/28/25    Expected End:  07/11/25            Increase hamstring flexibility to 70 degrees  (Progressing)       Start:  04/28/25    Expected End:  07/11/25            Increase the patients FOTO score to 70  (Progressing)       Start:  04/28/25    Expected End:  07/11/25                  Wayne Ambrose, MS, PT, ATC

## 2025-06-27 ENCOUNTER — CLINICAL SUPPORT (OUTPATIENT)
Dept: REHABILITATION | Facility: HOSPITAL | Age: 70
End: 2025-06-27
Payer: MEDICARE

## 2025-06-27 DIAGNOSIS — M25.612 DECREASED ROM OF LEFT SHOULDER: ICD-10-CM

## 2025-06-27 DIAGNOSIS — R29.898 IMPAIRED FLEXIBILITY OF LOWER EXTREMITY: ICD-10-CM

## 2025-06-27 DIAGNOSIS — R29.898 DECREASED STRENGTH OF UPPER EXTREMITY: ICD-10-CM

## 2025-06-27 DIAGNOSIS — M53.86 DECREASED ROM OF LUMBAR SPINE: ICD-10-CM

## 2025-06-27 DIAGNOSIS — M50.222 HERNIATED NUCLEUS PULPOSUS, C5-6: Primary | ICD-10-CM

## 2025-06-27 PROCEDURE — 97140 MANUAL THERAPY 1/> REGIONS: CPT | Mod: PN | Performed by: PHYSICAL THERAPIST

## 2025-06-27 PROCEDURE — 97014 ELECTRIC STIMULATION THERAPY: CPT | Mod: PN | Performed by: PHYSICAL THERAPIST

## 2025-08-19 ENCOUNTER — TELEPHONE (OUTPATIENT)
Dept: OTOLARYNGOLOGY | Facility: CLINIC | Age: 70
End: 2025-08-19
Payer: MEDICARE

## 2025-08-26 ENCOUNTER — OFFICE VISIT (OUTPATIENT)
Dept: OTOLARYNGOLOGY | Facility: CLINIC | Age: 70
End: 2025-08-26
Payer: MEDICARE

## 2025-08-26 VITALS — HEIGHT: 67 IN | WEIGHT: 215.81 LBS | BODY MASS INDEX: 33.87 KG/M2

## 2025-08-26 DIAGNOSIS — H61.21 CERUMEN DEBRIS ON TYMPANIC MEMBRANE, RIGHT: ICD-10-CM

## 2025-08-26 DIAGNOSIS — S00.411S: ICD-10-CM

## 2025-08-26 DIAGNOSIS — H60.531 ACUTE CONTACT OTITIS EXTERNA OF RIGHT EAR: Primary | ICD-10-CM

## 2025-08-26 PROCEDURE — 99213 OFFICE O/P EST LOW 20 MIN: CPT | Mod: 25,S$GLB,, | Performed by: OTOLARYNGOLOGY

## 2025-08-26 PROCEDURE — 1126F AMNT PAIN NOTED NONE PRSNT: CPT | Mod: CPTII,S$GLB,, | Performed by: OTOLARYNGOLOGY

## 2025-08-26 PROCEDURE — 1159F MED LIST DOCD IN RCRD: CPT | Mod: CPTII,S$GLB,, | Performed by: OTOLARYNGOLOGY

## 2025-08-26 PROCEDURE — 1101F PT FALLS ASSESS-DOCD LE1/YR: CPT | Mod: CPTII,S$GLB,, | Performed by: OTOLARYNGOLOGY

## 2025-08-26 PROCEDURE — 99999 PR PBB SHADOW E&M-EST. PATIENT-LVL III: CPT | Mod: PBBFAC,,, | Performed by: OTOLARYNGOLOGY

## 2025-08-26 PROCEDURE — 1160F RVW MEDS BY RX/DR IN RCRD: CPT | Mod: CPTII,S$GLB,, | Performed by: OTOLARYNGOLOGY

## 2025-08-26 PROCEDURE — 69210 REMOVE IMPACTED EAR WAX UNI: CPT | Mod: S$GLB,,, | Performed by: OTOLARYNGOLOGY

## 2025-08-26 PROCEDURE — 3008F BODY MASS INDEX DOCD: CPT | Mod: CPTII,S$GLB,, | Performed by: OTOLARYNGOLOGY

## 2025-08-26 PROCEDURE — 3288F FALL RISK ASSESSMENT DOCD: CPT | Mod: CPTII,S$GLB,, | Performed by: OTOLARYNGOLOGY

## 2025-08-26 RX ORDER — MINERAL OIL 1000 MG/ML
LIQUID TOPICAL
Start: 2025-08-26

## 2025-08-26 RX ORDER — OXYMETAZOLINE HCL 0.05 %
SPRAY, NON-AEROSOL (ML) NASAL
Qty: 22 ML | Refills: 0 | Status: SHIPPED | OUTPATIENT
Start: 2025-08-26

## 2025-08-28 ENCOUNTER — TELEPHONE (OUTPATIENT)
Dept: OTOLARYNGOLOGY | Facility: CLINIC | Age: 70
End: 2025-08-28
Payer: MEDICARE

## 2025-08-28 DIAGNOSIS — H60.531 ACUTE CONTACT OTITIS EXTERNA OF RIGHT EAR: Primary | ICD-10-CM

## 2025-08-28 DIAGNOSIS — S00.411S: ICD-10-CM

## 2025-08-28 RX ORDER — PREDNISOLONE ACETATE 10 MG/ML
SUSPENSION/ DROPS OPHTHALMIC
Qty: 5 ML | Refills: 0 | Status: SHIPPED | OUTPATIENT
Start: 2025-08-28

## (undated) DEVICE — SUTURE MONOCRYL 3-0 27 PS-1 MCP936H

## (undated) DEVICE — STERISTRIP 1/2 R1547

## (undated) DEVICE — SOLUTION IRRI NS BOTTLE 1000ML R5200-01

## (undated) DEVICE — PAD BOVIE ADULT

## (undated) DEVICE — SUTURE VICRYL 2-0 27 SH VCP417H

## (undated) DEVICE — GLOVE BIOGEL PI GOLD SZ  8.5

## (undated) DEVICE — UNDERGLOVE BIOGEL MICRO BLUE SZ 8.5

## (undated) DEVICE — BANDAGE ACE STERILE 4 REB3114

## (undated) DEVICE — PADDING CAST 4 STERILE 30-321

## (undated) DEVICE — TRAY UPPER EXTREMITY

## (undated) DEVICE — PADDING CAST 3 STERILE 30-320

## (undated) DEVICE — BLADE SCALPEL #15 371115

## (undated) DEVICE — SUTURE ETHILON 3-0 PS-1 18 1663H

## (undated) DEVICE — SPONGE GAUZE 10S 4X4  442214

## (undated) DEVICE — CUFF TOURNIQUET 18DUAL PRT 5921-218-235

## (undated) DEVICE — ADHESIVE MASTISOL VIAL 0523-48